# Patient Record
Sex: FEMALE | URBAN - METROPOLITAN AREA
[De-identification: names, ages, dates, MRNs, and addresses within clinical notes are randomized per-mention and may not be internally consistent; named-entity substitution may affect disease eponyms.]

---

## 2021-03-01 ENCOUNTER — APPOINTMENT (RX ONLY)
Dept: URBAN - METROPOLITAN AREA CLINIC 142 | Facility: CLINIC | Age: 76
Setting detail: DERMATOLOGY
End: 2021-03-01

## 2021-03-01 DIAGNOSIS — L57.0 ACTINIC KERATOSIS: ICD-10-CM

## 2021-03-01 DIAGNOSIS — D22 MELANOCYTIC NEVI: ICD-10-CM

## 2021-03-01 DIAGNOSIS — I87.2 VENOUS INSUFFICIENCY (CHRONIC) (PERIPHERAL): ICD-10-CM | Status: INADEQUATELY CONTROLLED

## 2021-03-01 DIAGNOSIS — L82.1 OTHER SEBORRHEIC KERATOSIS: ICD-10-CM

## 2021-03-01 DIAGNOSIS — L81.4 OTHER MELANIN HYPERPIGMENTATION: ICD-10-CM

## 2021-03-01 DIAGNOSIS — L91.8 OTHER HYPERTROPHIC DISORDERS OF THE SKIN: ICD-10-CM

## 2021-03-01 PROBLEM — D22.61 MELANOCYTIC NEVI OF RIGHT UPPER LIMB, INCLUDING SHOULDER: Status: ACTIVE | Noted: 2021-03-01

## 2021-03-01 PROBLEM — D22.71 MELANOCYTIC NEVI OF RIGHT LOWER LIMB, INCLUDING HIP: Status: ACTIVE | Noted: 2021-03-01

## 2021-03-01 PROBLEM — D22.72 MELANOCYTIC NEVI OF LEFT LOWER LIMB, INCLUDING HIP: Status: ACTIVE | Noted: 2021-03-01

## 2021-03-01 PROBLEM — D22.39 MELANOCYTIC NEVI OF OTHER PARTS OF FACE: Status: ACTIVE | Noted: 2021-03-01

## 2021-03-01 PROBLEM — D22.62 MELANOCYTIC NEVI OF LEFT UPPER LIMB, INCLUDING SHOULDER: Status: ACTIVE | Noted: 2021-03-01

## 2021-03-01 PROBLEM — D22.4 MELANOCYTIC NEVI OF SCALP AND NECK: Status: ACTIVE | Noted: 2021-03-01

## 2021-03-01 PROCEDURE — ? PRESCRIPTION

## 2021-03-01 PROCEDURE — 99204 OFFICE O/P NEW MOD 45 MIN: CPT | Mod: 25

## 2021-03-01 PROCEDURE — ? LIQUID NITROGEN

## 2021-03-01 PROCEDURE — ? SUNSCREEN RECOMMENDATIONS

## 2021-03-01 PROCEDURE — 17000 DESTRUCT PREMALG LESION: CPT

## 2021-03-01 PROCEDURE — ? COUNSELING

## 2021-03-01 RX ORDER — TRIAMCINOLONE ACETONIDE 1 MG/G
CREAM TOPICAL BID
Qty: 1 | Refills: 1 | Status: ERX | COMMUNITY
Start: 2021-03-01

## 2021-03-01 RX ADMIN — TRIAMCINOLONE ACETONIDE: 1 CREAM TOPICAL at 00:00

## 2021-03-01 ASSESSMENT — LOCATION DETAILED DESCRIPTION DERM
LOCATION DETAILED: LEFT SUPERIOR LATERAL BUCCAL CHEEK
LOCATION DETAILED: RIGHT PROXIMAL PRETIBIAL REGION
LOCATION DETAILED: RIGHT CENTRAL LATERAL NECK
LOCATION DETAILED: LEFT DISTAL DORSAL FOREARM
LOCATION DETAILED: RIGHT VENTRAL PROXIMAL FOREARM
LOCATION DETAILED: LEFT LATERAL BUCCAL CHEEK
LOCATION DETAILED: RIGHT INFERIOR ANTERIOR NECK
LOCATION DETAILED: LEFT PROXIMAL PRETIBIAL REGION
LOCATION DETAILED: RIGHT PROXIMAL DORSAL FOREARM
LOCATION DETAILED: RIGHT INFERIOR CENTRAL MALAR CHEEK

## 2021-03-01 ASSESSMENT — LOCATION ZONE DERM
LOCATION ZONE: LEG
LOCATION ZONE: FACE
LOCATION ZONE: ARM
LOCATION ZONE: NECK

## 2021-03-01 ASSESSMENT — LOCATION SIMPLE DESCRIPTION DERM
LOCATION SIMPLE: LEFT CHEEK
LOCATION SIMPLE: RIGHT ANTERIOR NECK
LOCATION SIMPLE: RIGHT CHEEK
LOCATION SIMPLE: LEFT FOREARM
LOCATION SIMPLE: NECK
LOCATION SIMPLE: RIGHT PRETIBIAL REGION
LOCATION SIMPLE: RIGHT FOREARM
LOCATION SIMPLE: LEFT PRETIBIAL REGION

## 2021-03-01 ASSESSMENT — PAIN INTENSITY VAS
HOW INTENSE IS YOUR PAIN 0 BEING NO PAIN, 10 BEING THE MOST SEVERE PAIN POSSIBLE?: NO PAIN
HOW INTENSE IS YOUR PAIN 0 BEING NO PAIN, 10 BEING THE MOST SEVERE PAIN POSSIBLE?: 1/10 PAIN

## 2021-03-01 ASSESSMENT — SEVERITY ASSESSMENT: SEVERITY: MILD

## 2021-03-01 NOTE — PROCEDURE: LIQUID NITROGEN
Render Note In Bullet Format When Appropriate: No
Post-Care Instructions: I reviewed with the patient in detail post-care instructions. Patient is to wear sunprotection, and avoid picking at any of the treated lesions. Pt may apply Vaseline to crusted or scabbing areas.
Consent: The patient's consent was obtained including but not limited to risks of crusting, scabbing, blistering, scarring, darker or lighter pigmentary change, recurrence, incomplete removal and infection.
Duration Of Freeze Thaw-Cycle (Seconds): 0
Detail Level: Detailed
Medical Necessity Information: It is in your best interest to select a reason for this procedure from the list below. All of these items fulfill various CMS LCD requirements except the new and changing color options.
Medical Necessity Clause: This procedure was medically necessary because the lesions that were treated were:

## 2021-07-30 ENCOUNTER — HOSPITAL ENCOUNTER (INPATIENT)
Dept: HOSPITAL 96 - M.ERS | Age: 76
LOS: 47 days | Discharge: HOSPICE | DRG: 870 | End: 2021-09-15
Attending: INTERNAL MEDICINE | Admitting: INTERNAL MEDICINE
Payer: COMMERCIAL

## 2021-07-30 VITALS — HEIGHT: 65.98 IN | WEIGHT: 134 LBS | BODY MASS INDEX: 21.53 KG/M2

## 2021-07-30 DIAGNOSIS — I10: ICD-10-CM

## 2021-07-30 DIAGNOSIS — A41.89: Primary | ICD-10-CM

## 2021-07-30 DIAGNOSIS — Z88.1: ICD-10-CM

## 2021-07-30 DIAGNOSIS — R91.1: ICD-10-CM

## 2021-07-30 DIAGNOSIS — E46: ICD-10-CM

## 2021-07-30 DIAGNOSIS — F32.9: ICD-10-CM

## 2021-07-30 DIAGNOSIS — Z91.19: ICD-10-CM

## 2021-07-30 DIAGNOSIS — J80: ICD-10-CM

## 2021-07-30 DIAGNOSIS — J93.9: ICD-10-CM

## 2021-07-30 DIAGNOSIS — Z51.5: ICD-10-CM

## 2021-07-30 DIAGNOSIS — E87.70: ICD-10-CM

## 2021-07-30 DIAGNOSIS — Z66: ICD-10-CM

## 2021-07-30 DIAGNOSIS — T80.211A: ICD-10-CM
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
## 2021-07-30 DIAGNOSIS — F41.9: ICD-10-CM

## 2021-07-30 DIAGNOSIS — J15.1: ICD-10-CM

## 2021-07-30 DIAGNOSIS — T70.29XA: ICD-10-CM

## 2021-07-30 DIAGNOSIS — B37.9: ICD-10-CM

## 2021-07-30 DIAGNOSIS — E87.6: ICD-10-CM

## 2021-07-30 DIAGNOSIS — A52.19: ICD-10-CM

## 2021-07-30 DIAGNOSIS — U07.1: ICD-10-CM

## 2021-07-30 DIAGNOSIS — D69.6: ICD-10-CM

## 2021-07-30 DIAGNOSIS — J12.82: ICD-10-CM

## 2021-07-30 LAB
ABSOLUTE BASOPHILS: 0 THOU/UL (ref 0–0.2)
ABSOLUTE EOSINOPHILS: 0 THOU/UL (ref 0–0.7)
ABSOLUTE MONOCYTES: 0.4 THOU/UL (ref 0–1.2)
ALBUMIN SERPL-MCNC: 3.4 G/DL (ref 3.4–5)
ALP SERPL-CCNC: 65 U/L (ref 46–116)
ALT SERPL-CCNC: 36 U/L (ref 30–65)
ANION GAP SERPL CALC-SCNC: 12 MMOL/L (ref 7–16)
AST SERPL-CCNC: 41 U/L (ref 15–37)
BASOPHILS NFR BLD AUTO: 0.4 %
BE: -0.8 MMOL/L
BILIRUB SERPL-MCNC: 0.5 MG/DL
BILIRUB UR-MCNC: NEGATIVE MG/DL
BUN SERPL-MCNC: 12 MG/DL (ref 7–18)
CALCIUM SERPL-MCNC: 7.7 MG/DL (ref 8.5–10.1)
CHLORIDE SERPL-SCNC: 102 MMOL/L (ref 98–107)
CO2 SERPL-SCNC: 26 MMOL/L (ref 21–32)
COLOR UR: YELLOW
CREAT SERPL-MCNC: 0.7 MG/DL (ref 0.6–1.3)
EOSINOPHIL NFR BLD: 0 %
GLUCOSE SERPL-MCNC: 115 MG/DL (ref 70–99)
GRANULOCYTES NFR BLD MANUAL: 76.6 %
HCT VFR BLD CALC: 39.2 % (ref 37–47)
HGB BLD-MCNC: 14.2 GM/DL (ref 12–15)
KETONES UR STRIP-MCNC: (no result) MG/DL
LYMPHOCYTES # BLD: 0.6 THOU/UL (ref 0.8–5.3)
LYMPHOCYTES NFR BLD AUTO: 13.8 %
MAGNESIUM SERPL-MCNC: 1.9 MG/DL (ref 1.8–2.4)
MCH RBC QN AUTO: 33.8 PG (ref 26–34)
MCHC RBC AUTO-ENTMCNC: 36.2 G/DL (ref 28–37)
MCV RBC: 93.3 FL (ref 80–100)
MONOCYTES NFR BLD: 9.2 %
MPV: 7.9 FL. (ref 7.2–11.1)
NEUTROPHILS # BLD: 3.2 THOU/UL (ref 1.6–8.1)
NUCLEATED RBCS: 0 /100WBC
PCO2 BLD: 30.5 MMHG (ref 35–45)
PLATELET COUNT*: 133 THOU/UL (ref 150–400)
PO2 BLD: 57.9 MMHG (ref 75–100)
POTASSIUM SERPL-SCNC: 2.5 MMOL/L (ref 3.5–5.1)
PROT SERPL-MCNC: 6.4 G/DL (ref 6.4–8.2)
PROT UR QL STRIP: NEGATIVE
RBC # BLD AUTO: 4.2 MIL/UL (ref 4.2–5)
RBC # UR STRIP: (no result) /UL
RDW-CV: 13.1 % (ref 10.5–14.5)
SODIUM SERPL-SCNC: 140 MMOL/L (ref 136–145)
SP GR UR STRIP: 1.01 (ref 1–1.03)
URINE CLARITY: CLEAR
URINE GLUCOSE-RANDOM: NEGATIVE
URINE LEUKOCYTES-REFLEX: NEGATIVE
URINE NITRITE-REFLEX: NEGATIVE
UROBILINOGEN UR STRIP-ACNC: 0.2 E.U./DL (ref 0.2–1)
WBC # BLD AUTO: 4.1 THOU/UL (ref 4–11)

## 2021-07-31 VITALS — DIASTOLIC BLOOD PRESSURE: 67 MMHG | SYSTOLIC BLOOD PRESSURE: 147 MMHG

## 2021-07-31 VITALS — SYSTOLIC BLOOD PRESSURE: 164 MMHG | DIASTOLIC BLOOD PRESSURE: 64 MMHG

## 2021-07-31 VITALS — SYSTOLIC BLOOD PRESSURE: 167 MMHG | DIASTOLIC BLOOD PRESSURE: 51 MMHG

## 2021-07-31 VITALS — DIASTOLIC BLOOD PRESSURE: 78 MMHG | SYSTOLIC BLOOD PRESSURE: 140 MMHG

## 2021-07-31 VITALS — DIASTOLIC BLOOD PRESSURE: 66 MMHG | SYSTOLIC BLOOD PRESSURE: 130 MMHG

## 2021-07-31 VITALS — SYSTOLIC BLOOD PRESSURE: 150 MMHG | DIASTOLIC BLOOD PRESSURE: 72 MMHG

## 2021-07-31 NOTE — EKG
[place] [address]
Phone:  [phone]                     ELECTROCARDIOGRAM REPORT      
_______________________________________________________________________________
 
Name:         ORALIA GUDINO               Room:          [address] [place] 
.R.#:    [id number]     Account #:     [id number]  
Admission:    21    Attend Phys:   James Leigh
Discharge:                Date of Birth: [birth year]  
Date of Service: 21  Report #:      [id number]
        [id number]
_______________________________________________________________________________
THIS REPORT FOR:  //name//                      
 
                         [facility] ED
                                       
Test Date:    2021               Test Time:    20:51:36
Pat Name:     ORALIAAUGUSTUS GUDINO            Department:   
Patient ID:   [id number]            Room:         [facility]
Gender:       F                        Technician:   
:          1945               Requested By: Charla Aguilar
Order Number: [id number] MD:   Scooter Zepeda
                                 Measurements
Intervals                              Axis          
Rate:         85                       P:            38
OR:           149                      QRS:          -35
QRSD:         98                       T:            76
QT:           372                                    
QTc:          443                                    
                           Interpretive Statements
Sinus rhythm
Multiform ventricular premature complexes
Probable left atrial enlargement
Left axis deviation
RSR' in V1 or V2, right VCD or RVH
No previous ECG available for comparison
Electronically Signed On 2021 10:51:44 CDT by Scooter Zepeda
[email or web address]
 
 
 
 
 
 
 
 
 
 
 
 
 
 
 
 
 
 
  <ELECTRONICALLY SIGNED>
                                           By: Scooter Zepeda MD, FACC      
  21
D: 21   _____________________________________
T: 21   Scooter Zepeda MD, FAC        /EPI

## 2021-08-01 VITALS — SYSTOLIC BLOOD PRESSURE: 146 MMHG | DIASTOLIC BLOOD PRESSURE: 67 MMHG

## 2021-08-01 VITALS — SYSTOLIC BLOOD PRESSURE: 144 MMHG | DIASTOLIC BLOOD PRESSURE: 61 MMHG

## 2021-08-01 VITALS — SYSTOLIC BLOOD PRESSURE: 140 MMHG | DIASTOLIC BLOOD PRESSURE: 62 MMHG

## 2021-08-01 VITALS — SYSTOLIC BLOOD PRESSURE: 148 MMHG | DIASTOLIC BLOOD PRESSURE: 76 MMHG

## 2021-08-01 VITALS — DIASTOLIC BLOOD PRESSURE: 61 MMHG | SYSTOLIC BLOOD PRESSURE: 144 MMHG

## 2021-08-01 LAB
ANION GAP SERPL CALC-SCNC: 11 MMOL/L (ref 7–16)
BUN SERPL-MCNC: 8 MG/DL (ref 7–18)
CALCIUM SERPL-MCNC: 7.6 MG/DL (ref 8.5–10.1)
CHLORIDE SERPL-SCNC: 105 MMOL/L (ref 98–107)
CO2 SERPL-SCNC: 25 MMOL/L (ref 21–32)
CREAT SERPL-MCNC: 0.6 MG/DL (ref 0.6–1.3)
GLUCOSE SERPL-MCNC: 101 MG/DL (ref 70–99)
HCT VFR BLD CALC: 38.2 % (ref 37–47)
HGB BLD-MCNC: 13.6 GM/DL (ref 12–15)
MCH RBC QN AUTO: 33.4 PG (ref 26–34)
MCHC RBC AUTO-ENTMCNC: 35.6 G/DL (ref 28–37)
MCV RBC: 93.8 FL (ref 80–100)
MPV: 7.7 FL. (ref 7.2–11.1)
PLATELET COUNT*: 125 THOU/UL (ref 150–400)
POTASSIUM SERPL-SCNC: 2.4 MMOL/L (ref 3.5–5.1)
RBC # BLD AUTO: 4.07 MIL/UL (ref 4.2–5)
RDW-CV: 13.1 % (ref 10.5–14.5)
SODIUM SERPL-SCNC: 141 MMOL/L (ref 136–145)
WBC # BLD AUTO: 3.6 THOU/UL (ref 4–11)

## 2021-08-01 PROCEDURE — 5A0935A ASSISTANCE WITH RESPIRATORY VENTILATION, LESS THAN 24 CONSECUTIVE HOURS, HIGH NASAL FLOW/VELOCITY: ICD-10-PCS | Performed by: INTERNAL MEDICINE

## 2021-08-02 VITALS — SYSTOLIC BLOOD PRESSURE: 110 MMHG | DIASTOLIC BLOOD PRESSURE: 61 MMHG

## 2021-08-02 VITALS — DIASTOLIC BLOOD PRESSURE: 52 MMHG | SYSTOLIC BLOOD PRESSURE: 125 MMHG

## 2021-08-02 VITALS — DIASTOLIC BLOOD PRESSURE: 55 MMHG | SYSTOLIC BLOOD PRESSURE: 116 MMHG

## 2021-08-02 VITALS — DIASTOLIC BLOOD PRESSURE: 59 MMHG | SYSTOLIC BLOOD PRESSURE: 123 MMHG

## 2021-08-02 LAB
ANION GAP SERPL CALC-SCNC: 10 MMOL/L (ref 7–16)
BUN SERPL-MCNC: 9 MG/DL (ref 7–18)
CALCIUM SERPL-MCNC: 7.7 MG/DL (ref 8.5–10.1)
CHLORIDE SERPL-SCNC: 109 MMOL/L (ref 98–107)
CO2 SERPL-SCNC: 24 MMOL/L (ref 21–32)
CREAT SERPL-MCNC: 0.5 MG/DL (ref 0.6–1.3)
GLUCOSE SERPL-MCNC: 121 MG/DL (ref 70–99)
HCT VFR BLD CALC: 36.8 % (ref 37–47)
HGB BLD-MCNC: 13.1 GM/DL (ref 12–15)
MCH RBC QN AUTO: 33.6 PG (ref 26–34)
MCHC RBC AUTO-ENTMCNC: 35.6 G/DL (ref 28–37)
MCV RBC: 94.4 FL (ref 80–100)
MPV: 7.7 FL. (ref 7.2–11.1)
PLATELET COUNT*: 139 THOU/UL (ref 150–400)
POTASSIUM SERPL-SCNC: 3.1 MMOL/L (ref 3.5–5.1)
RBC # BLD AUTO: 3.9 MIL/UL (ref 4.2–5)
RDW-CV: 13.1 % (ref 10.5–14.5)
SODIUM SERPL-SCNC: 143 MMOL/L (ref 136–145)
WBC # BLD AUTO: 4.6 THOU/UL (ref 4–11)

## 2021-08-02 PROCEDURE — XW033E5 INTRODUCTION OF REMDESIVIR ANTI-INFECTIVE INTO PERIPHERAL VEIN, PERCUTANEOUS APPROACH, NEW TECHNOLOGY GROUP 5: ICD-10-PCS

## 2021-08-02 PROCEDURE — 5A09357 ASSISTANCE WITH RESPIRATORY VENTILATION, LESS THAN 24 CONSECUTIVE HOURS, CONTINUOUS POSITIVE AIRWAY PRESSURE: ICD-10-PCS | Performed by: INTERNAL MEDICINE

## 2021-08-03 VITALS — DIASTOLIC BLOOD PRESSURE: 65 MMHG | SYSTOLIC BLOOD PRESSURE: 129 MMHG

## 2021-08-03 VITALS — DIASTOLIC BLOOD PRESSURE: 68 MMHG | SYSTOLIC BLOOD PRESSURE: 132 MMHG

## 2021-08-03 VITALS — SYSTOLIC BLOOD PRESSURE: 139 MMHG | DIASTOLIC BLOOD PRESSURE: 76 MMHG

## 2021-08-03 VITALS — SYSTOLIC BLOOD PRESSURE: 129 MMHG | DIASTOLIC BLOOD PRESSURE: 68 MMHG

## 2021-08-03 VITALS — DIASTOLIC BLOOD PRESSURE: 69 MMHG | SYSTOLIC BLOOD PRESSURE: 133 MMHG

## 2021-08-03 VITALS — SYSTOLIC BLOOD PRESSURE: 134 MMHG | DIASTOLIC BLOOD PRESSURE: 66 MMHG

## 2021-08-03 VITALS — SYSTOLIC BLOOD PRESSURE: 124 MMHG | DIASTOLIC BLOOD PRESSURE: 70 MMHG

## 2021-08-03 VITALS — SYSTOLIC BLOOD PRESSURE: 124 MMHG | DIASTOLIC BLOOD PRESSURE: 63 MMHG

## 2021-08-03 LAB
ABSOLUTE MONOCYTES: 0.1 THOU/UL (ref 0–1.2)
ALBUMIN SERPL-MCNC: 2.8 G/DL (ref 3.4–5)
ALP SERPL-CCNC: 56 U/L (ref 46–116)
ALT SERPL-CCNC: 44 U/L (ref 30–65)
ANION GAP SERPL CALC-SCNC: 9 MMOL/L (ref 7–16)
ANION GAP SERPL CALC-SCNC: 9 MMOL/L (ref 7–16)
ANISOCYTOSIS BLD QL SMEAR: (no result)
APTT BLD: 32.3 SECONDS (ref 25–31.3)
AST SERPL-CCNC: 63 U/L (ref 15–37)
BILIRUB SERPL-MCNC: 0.3 MG/DL
BUN SERPL-MCNC: 11 MG/DL (ref 7–18)
BUN SERPL-MCNC: 14 MG/DL (ref 7–18)
CALCIUM SERPL-MCNC: 8.3 MG/DL (ref 8.5–10.1)
CALCIUM SERPL-MCNC: 8.5 MG/DL (ref 8.5–10.1)
CHLORIDE SERPL-SCNC: 106 MMOL/L (ref 98–107)
CHLORIDE SERPL-SCNC: 107 MMOL/L (ref 98–107)
CO2 SERPL-SCNC: 26 MMOL/L (ref 21–32)
CO2 SERPL-SCNC: 27 MMOL/L (ref 21–32)
CREAT SERPL-MCNC: 0.6 MG/DL (ref 0.6–1.3)
CREAT SERPL-MCNC: 0.6 MG/DL (ref 0.6–1.3)
GLUCOSE SERPL-MCNC: 155 MG/DL (ref 70–99)
GLUCOSE SERPL-MCNC: 165 MG/DL (ref 70–99)
GRANULOCYTES NFR BLD MANUAL: 89 %
HCT VFR BLD CALC: 38.6 % (ref 37–47)
HGB BLD-MCNC: 13.7 GM/DL (ref 12–15)
INR PPP: 0.9
LYMPHOCYTES # BLD: 0.6 THOU/UL (ref 0.8–5.3)
LYMPHOCYTES NFR BLD AUTO: 9 %
MAGNESIUM SERPL-MCNC: 2 MG/DL (ref 1.8–2.4)
MAGNESIUM SERPL-MCNC: 2 MG/DL (ref 1.8–2.4)
MCH RBC QN AUTO: 33.3 PG (ref 26–34)
MCHC RBC AUTO-ENTMCNC: 35.4 G/DL (ref 28–37)
MCV RBC: 94.2 FL (ref 80–100)
MONOCYTES NFR BLD: 2 %
MPV: 7.8 FL. (ref 7.2–11.1)
NEUTROPHILS # BLD: 6.1 THOU/UL (ref 1.6–8.1)
NUCLEATED RBCS: 0 /100WBC
PHOSPHATE SERPL-MCNC: 3 MG/DL (ref 2.5–4.9)
PLATELET # BLD EST: ADEQUATE 10*3/UL
PLATELET COUNT*: 181 THOU/UL (ref 150–400)
POIKILOCYTOSIS BLD QL SMEAR: (no result)
POTASSIUM SERPL-SCNC: 4 MMOL/L (ref 3.5–5.1)
POTASSIUM SERPL-SCNC: 4.1 MMOL/L (ref 3.5–5.1)
PROT SERPL-MCNC: 6.2 G/DL (ref 6.4–8.2)
PROTHROMBIN TIME: 10.1 SECONDS (ref 9.2–11.5)
RBC # BLD AUTO: 4.1 MIL/UL (ref 4.2–5)
RDW-CV: 13.3 % (ref 10.5–14.5)
SODIUM SERPL-SCNC: 142 MMOL/L (ref 136–145)
SODIUM SERPL-SCNC: 142 MMOL/L (ref 136–145)
WBC # BLD AUTO: 6.9 THOU/UL (ref 4–11)

## 2021-08-03 PROCEDURE — 5A0935A ASSISTANCE WITH RESPIRATORY VENTILATION, LESS THAN 24 CONSECUTIVE HOURS, HIGH NASAL FLOW/VELOCITY: ICD-10-PCS

## 2021-08-03 PROCEDURE — 5A09357 ASSISTANCE WITH RESPIRATORY VENTILATION, LESS THAN 24 CONSECUTIVE HOURS, CONTINUOUS POSITIVE AIRWAY PRESSURE: ICD-10-PCS

## 2021-08-03 PROCEDURE — XW13325 TRANSFUSION OF CONVALESCENT PLASMA (NONAUTOLOGOUS) INTO PERIPHERAL VEIN, PERCUTANEOUS APPROACH, NEW TECHNOLOGY GROUP 5: ICD-10-PCS | Performed by: INTERNAL MEDICINE

## 2021-08-03 NOTE — 2DMMODE
[place] [address]
Phone:  [phone] 2 D/M-MODE ECHOCARDIOGRAM     
_______________________________________________________________________________
 
Name:         ORALIA GUDINO               Room:          [address] [place] 
[facility]#:    [id number]     Account #:     [id number]  
Admission:    21    Attend Phys:   James Leigh
Discharge:                Date of Birth: [birth year]  
Date of Service: 21 1522  Report #:      [id number]
        [id number]
_______________________________________________________________________________
THIS REPORT FOR:
 
cc:  Alex Brady Michael G DO Biggs, [facility]      
                                                                       ~
 
--------------- APPROVED REPORT --------------
 
 
Study performed:  2021 09:45:46
 
EXAM: Comprehensive 2D, Doppler, and color-flow 
Echocardiogram 
Patient Location: In-Patient   
Room #:  [facility]     Status:  routine
 
      BSA:         1.86
HR: 91 bpm BP:          129/68 mmHg 
Rhythm: NSR     
 
Other Information 
Study Quality: Good
 
Indications
hypoxia
 
2D Dimensions
IVSd:  9.46 (7-11mm) LVOT Diam:  21.37 (18-24mm) 
LVDd:  56.15 mm  
PWd:  9.81 (7-11mm) Ascending Ao:  32.06 (22-36mm)
LVDs:  30.05 (25-40mm) 
Aortic Root:  31.84 mm 
 
Volumes
Left Atrial Volume (Systole) 
    LA ESV Index:  27.20 mL/m2
 
Aortic Valve
AoV Peak Luis.:  1.63 m/s 
AO Peak Gr.:  10.57 mmHg LVOT Max P.52 mmHg
AO Mean Gr.:  5.93 mmHg  LVOT Mean P.14 mmHg
    LVOT Max V:  1.06 m/s
AO V2 VTI:  29.41 cm  LVOT Mean V:  0.67 m/s
BIMAL (VTI):  2.31 cm2  LVOT V1 VTI:  18.93 cm
 
 
 
[place] [address]
Phone:  [phone]                     2 D/M-MODE ECHOCARDIOGRAM     
_______________________________________________________________________________
 
Name:         ORALIA GUDINO               Room:          [address] [place] 
.R.#:    [id number]     Account #:     [id number]  
Admission:    21    Attend Phys:   James Leigh
Discharge:                Date of Birth: [birth year]  
Date of Service: 21 1522  Report #:      [id number]
        [id number]
_______________________________________________________________________________
Mitral Valve
    E/A Ratio:  0.79
    MV Decel. Time:  198.91 ms
MV E Max Luis.:  0.76 m/s 
MV PHT:  57.68 ms  
MVA (PHT):  3.81 cm2  
 
TDI
E/Lateral E':  7.60 E/Medial E':  7.60
   Medial E' Luis.:  0.10 m/s
   Lateral E' Luis.:  0.10 m/s
 
Pulmonary Valve
PV Peak Luis.:  1.15 m/s PV Peak Gr.:  5.32 mmHg
 
Tricuspid Valve
    RAP Estimate:  5.00 mmHg
TR Peak Gr.:  28.31 mmHg RVSP:  33.00 mmHg
    PA Pressure:  33.00 mmHg
 
Left Ventricle
The left ventricle is normal size. There is normal LV segmental wall 
motion. There is normal left ventricular wall thickness. Left 
ventricular systolic function is normal. The left ventricular 
ejection fraction is within the normal range. LVEF is 65-70%. Grade I 
- abnormal relaxation pattern.
 
Right Ventricle
The right ventricle is normal size. The right ventricular systolic 
function is normal.
 
Atria
The left atrium size is normal. The right atrium size is 
normal.
 
Aortic Valve
The aortic valve is normal in structure. No aortic regurgitation is 
present. There is no aortic valvular stenosis.
 
Mitral Valve
The mitral valve is normal in structure. Trace mitral regurgitation. 
No evidence of mitral valve stenosis.
 
Tricuspid Valve
The tricuspid valve is normal in structure. Trace tricuspid 
regurgitation. Mild pulmonary hypertension.
 
 
[place] [address]
Phone:  [phone]                     2 D/M-MODE ECHOCARDIOGRAM     
_______________________________________________________________________________
 
Name:         ORALIA GUDINO               Room:          [address]#:    [id number]     Account #:     [id number]  
Admission:    21    Attend Phys:   James Leigh
Discharge:                Date of Birth: [birth year]  
Date of Service: 21 1522  Report #:      [id number]
        [id number]
_______________________________________________________________________________
 
Pulmonic Valve
The pulmonary valve is normal in structure. There is no pulmonic 
valvular regurgitation.
 
Great Vessels
The aortic root is normal in size. IVC is normal in size and 
collapses >50% with inspiration.
 
Pericardium
There is no pericardial effusion.
 
<Conclusion>
LVEF is 65-70%.
Grade I - abnormal relaxation pattern.
The left ventricle is normal size.
There is normal left ventricular wall thickness.
There is normal LV segmental wall motion.
The aortic valve is normal in structure.
Trace mitral regurgitation.
Trace tricuspid regurgitation.
Mild pulmonary hypertension.
 
 
 
 
 
 
 
 
 
 
 
 
 
 
 
 
 
 
 
 
 
 
  <ELECTRONICALLY SIGNED>
                                           By: MAYCO Van MD, FACC    
  21     1522
D: 21 152   _____________________________________
T: 21 152   MAYCO Van MD, FACC      /INF

## 2021-08-04 VITALS — DIASTOLIC BLOOD PRESSURE: 61 MMHG | SYSTOLIC BLOOD PRESSURE: 124 MMHG

## 2021-08-04 VITALS — DIASTOLIC BLOOD PRESSURE: 55 MMHG | SYSTOLIC BLOOD PRESSURE: 111 MMHG

## 2021-08-04 VITALS — SYSTOLIC BLOOD PRESSURE: 130 MMHG | DIASTOLIC BLOOD PRESSURE: 58 MMHG

## 2021-08-04 VITALS — SYSTOLIC BLOOD PRESSURE: 123 MMHG | DIASTOLIC BLOOD PRESSURE: 67 MMHG

## 2021-08-04 VITALS — DIASTOLIC BLOOD PRESSURE: 65 MMHG | SYSTOLIC BLOOD PRESSURE: 138 MMHG

## 2021-08-04 VITALS — SYSTOLIC BLOOD PRESSURE: 109 MMHG | DIASTOLIC BLOOD PRESSURE: 52 MMHG

## 2021-08-04 VITALS — DIASTOLIC BLOOD PRESSURE: 66 MMHG | SYSTOLIC BLOOD PRESSURE: 146 MMHG

## 2021-08-04 VITALS — SYSTOLIC BLOOD PRESSURE: 138 MMHG | DIASTOLIC BLOOD PRESSURE: 65 MMHG

## 2021-08-04 LAB
ABSOLUTE BASOPHILS: 0 THOU/UL (ref 0–0.2)
ABSOLUTE EOSINOPHILS: 0 THOU/UL (ref 0–0.7)
ABSOLUTE MONOCYTES: 0.4 THOU/UL (ref 0–1.2)
ALBUMIN SERPL-MCNC: 2.8 G/DL (ref 3.4–5)
ALP SERPL-CCNC: 58 U/L (ref 46–116)
ALT SERPL-CCNC: 65 U/L (ref 30–65)
ANION GAP SERPL CALC-SCNC: 7 MMOL/L (ref 7–16)
AST SERPL-CCNC: 78 U/L (ref 15–37)
BASOPHILS NFR BLD AUTO: 0 %
BILIRUB SERPL-MCNC: 0.4 MG/DL
BUN SERPL-MCNC: 21 MG/DL (ref 7–18)
CALCIUM SERPL-MCNC: 8.3 MG/DL (ref 8.5–10.1)
CHLORIDE SERPL-SCNC: 108 MMOL/L (ref 98–107)
CO2 SERPL-SCNC: 30 MMOL/L (ref 21–32)
CREAT SERPL-MCNC: 0.7 MG/DL (ref 0.6–1.3)
EOSINOPHIL NFR BLD: 0 %
GLUCOSE SERPL-MCNC: 155 MG/DL (ref 70–99)
GRANULOCYTES NFR BLD MANUAL: 88.2 %
HCT VFR BLD CALC: 38.5 % (ref 37–47)
HGB BLD-MCNC: 13.4 GM/DL (ref 12–15)
LYMPHOCYTES # BLD: 0.5 THOU/UL (ref 0.8–5.3)
LYMPHOCYTES NFR BLD AUTO: 6 %
MAGNESIUM SERPL-MCNC: 2 MG/DL (ref 1.8–2.4)
MCH RBC QN AUTO: 33.2 PG (ref 26–34)
MCHC RBC AUTO-ENTMCNC: 34.9 G/DL (ref 28–37)
MCV RBC: 95.1 FL (ref 80–100)
MONOCYTES NFR BLD: 5.8 %
MPV: 7.2 FL. (ref 7.2–11.1)
NEUTROPHILS # BLD: 6.8 THOU/UL (ref 1.6–8.1)
NUCLEATED RBCS: 0 /100WBC
PLATELET COUNT*: 213 THOU/UL (ref 150–400)
POTASSIUM SERPL-SCNC: 4 MMOL/L (ref 3.5–5.1)
PROT SERPL-MCNC: 6.3 G/DL (ref 6.4–8.2)
RBC # BLD AUTO: 4.05 MIL/UL (ref 4.2–5)
RDW-CV: 13.2 % (ref 10.5–14.5)
SODIUM SERPL-SCNC: 145 MMOL/L (ref 136–145)
WBC # BLD AUTO: 7.7 THOU/UL (ref 4–11)

## 2021-08-04 PROCEDURE — 5A0935A ASSISTANCE WITH RESPIRATORY VENTILATION, LESS THAN 24 CONSECUTIVE HOURS, HIGH NASAL FLOW/VELOCITY: ICD-10-PCS | Performed by: INTERNAL MEDICINE

## 2021-08-04 PROCEDURE — 5A09357 ASSISTANCE WITH RESPIRATORY VENTILATION, LESS THAN 24 CONSECUTIVE HOURS, CONTINUOUS POSITIVE AIRWAY PRESSURE: ICD-10-PCS

## 2021-08-05 VITALS — DIASTOLIC BLOOD PRESSURE: 57 MMHG | SYSTOLIC BLOOD PRESSURE: 119 MMHG

## 2021-08-05 VITALS — DIASTOLIC BLOOD PRESSURE: 68 MMHG | SYSTOLIC BLOOD PRESSURE: 134 MMHG

## 2021-08-05 VITALS — DIASTOLIC BLOOD PRESSURE: 66 MMHG | SYSTOLIC BLOOD PRESSURE: 122 MMHG

## 2021-08-05 VITALS — SYSTOLIC BLOOD PRESSURE: 132 MMHG | DIASTOLIC BLOOD PRESSURE: 61 MMHG

## 2021-08-05 VITALS — DIASTOLIC BLOOD PRESSURE: 57 MMHG | SYSTOLIC BLOOD PRESSURE: 103 MMHG

## 2021-08-05 LAB
ABSOLUTE BASOPHILS: 0 THOU/UL (ref 0–0.2)
ABSOLUTE EOSINOPHILS: 0 THOU/UL (ref 0–0.7)
ABSOLUTE MONOCYTES: 0.4 THOU/UL (ref 0–1.2)
ALBUMIN SERPL-MCNC: 2.8 G/DL (ref 3.4–5)
ALP SERPL-CCNC: 61 U/L (ref 46–116)
ALT SERPL-CCNC: 61 U/L (ref 30–65)
ANION GAP SERPL CALC-SCNC: 7 MMOL/L (ref 7–16)
AST SERPL-CCNC: 61 U/L (ref 15–37)
BASOPHILS NFR BLD AUTO: 0.2 %
BILIRUB SERPL-MCNC: 0.5 MG/DL
BUN SERPL-MCNC: 22 MG/DL (ref 7–18)
CALCIUM SERPL-MCNC: 8.3 MG/DL (ref 8.5–10.1)
CHLORIDE SERPL-SCNC: 109 MMOL/L (ref 98–107)
CO2 SERPL-SCNC: 30 MMOL/L (ref 21–32)
CREAT SERPL-MCNC: 0.7 MG/DL (ref 0.6–1.3)
EOSINOPHIL NFR BLD: 0 %
GLUCOSE SERPL-MCNC: 151 MG/DL (ref 70–99)
GRANULOCYTES NFR BLD MANUAL: 91.1 %
HCT VFR BLD CALC: 39.9 % (ref 37–47)
HGB BLD-MCNC: 14 GM/DL (ref 12–15)
LYMPHOCYTES # BLD: 0.5 THOU/UL (ref 0.8–5.3)
LYMPHOCYTES NFR BLD AUTO: 4.8 %
MAGNESIUM SERPL-MCNC: 2 MG/DL (ref 1.8–2.4)
MCH RBC QN AUTO: 33.2 PG (ref 26–34)
MCHC RBC AUTO-ENTMCNC: 35 G/DL (ref 28–37)
MCV RBC: 94.9 FL (ref 80–100)
MONOCYTES NFR BLD: 3.9 %
MPV: 7.1 FL. (ref 7.2–11.1)
NEUTROPHILS # BLD: 9.5 THOU/UL (ref 1.6–8.1)
NUCLEATED RBCS: 0 /100WBC
PLATELET COUNT*: 266 THOU/UL (ref 150–400)
POTASSIUM SERPL-SCNC: 4.1 MMOL/L (ref 3.5–5.1)
PROT SERPL-MCNC: 6.2 G/DL (ref 6.4–8.2)
RBC # BLD AUTO: 4.2 MIL/UL (ref 4.2–5)
RDW-CV: 13.2 % (ref 10.5–14.5)
SODIUM SERPL-SCNC: 146 MMOL/L (ref 136–145)
WBC # BLD AUTO: 10.5 THOU/UL (ref 4–11)

## 2021-08-05 PROCEDURE — 5A0935A ASSISTANCE WITH RESPIRATORY VENTILATION, LESS THAN 24 CONSECUTIVE HOURS, HIGH NASAL FLOW/VELOCITY: ICD-10-PCS | Performed by: INTERNAL MEDICINE

## 2021-08-05 PROCEDURE — 5A09357 ASSISTANCE WITH RESPIRATORY VENTILATION, LESS THAN 24 CONSECUTIVE HOURS, CONTINUOUS POSITIVE AIRWAY PRESSURE: ICD-10-PCS

## 2021-08-06 VITALS — SYSTOLIC BLOOD PRESSURE: 125 MMHG | DIASTOLIC BLOOD PRESSURE: 70 MMHG

## 2021-08-06 VITALS — DIASTOLIC BLOOD PRESSURE: 63 MMHG | SYSTOLIC BLOOD PRESSURE: 135 MMHG

## 2021-08-06 VITALS — SYSTOLIC BLOOD PRESSURE: 129 MMHG | DIASTOLIC BLOOD PRESSURE: 76 MMHG

## 2021-08-06 VITALS — DIASTOLIC BLOOD PRESSURE: 70 MMHG | SYSTOLIC BLOOD PRESSURE: 139 MMHG

## 2021-08-06 VITALS — SYSTOLIC BLOOD PRESSURE: 176 MMHG | DIASTOLIC BLOOD PRESSURE: 61 MMHG

## 2021-08-06 LAB
ABSOLUTE BASOPHILS: 0 THOU/UL (ref 0–0.2)
ABSOLUTE EOSINOPHILS: 0 THOU/UL (ref 0–0.7)
ABSOLUTE MONOCYTES: 0.4 THOU/UL (ref 0–1.2)
ALBUMIN SERPL-MCNC: 2.8 G/DL (ref 3.4–5)
ALP SERPL-CCNC: 64 U/L (ref 46–116)
ALT SERPL-CCNC: 50 U/L (ref 30–65)
ANION GAP SERPL CALC-SCNC: 9 MMOL/L (ref 7–16)
AST SERPL-CCNC: 43 U/L (ref 15–37)
BASOPHILS NFR BLD AUTO: 0.1 %
BILIRUB SERPL-MCNC: 0.5 MG/DL
BUN SERPL-MCNC: 24 MG/DL (ref 7–18)
CALCIUM SERPL-MCNC: 8.2 MG/DL (ref 8.5–10.1)
CHLORIDE SERPL-SCNC: 110 MMOL/L (ref 98–107)
CO2 SERPL-SCNC: 27 MMOL/L (ref 21–32)
CREAT SERPL-MCNC: 0.7 MG/DL (ref 0.6–1.3)
EOSINOPHIL NFR BLD: 0 %
GLUCOSE SERPL-MCNC: 156 MG/DL (ref 70–99)
GRANULOCYTES NFR BLD MANUAL: 89.3 %
HCT VFR BLD CALC: 39.2 % (ref 37–47)
HGB BLD-MCNC: 13.7 GM/DL (ref 12–15)
LYMPHOCYTES # BLD: 0.6 THOU/UL (ref 0.8–5.3)
LYMPHOCYTES NFR BLD AUTO: 6.2 %
MCH RBC QN AUTO: 33.3 PG (ref 26–34)
MCHC RBC AUTO-ENTMCNC: 34.9 G/DL (ref 28–37)
MCV RBC: 95.6 FL (ref 80–100)
MONOCYTES NFR BLD: 4.4 %
MPV: 7.1 FL. (ref 7.2–11.1)
NEUTROPHILS # BLD: 8.7 THOU/UL (ref 1.6–8.1)
NUCLEATED RBCS: 0 /100WBC
PLATELET COUNT*: 273 THOU/UL (ref 150–400)
POTASSIUM SERPL-SCNC: 4 MMOL/L (ref 3.5–5.1)
PREALB SERPL-MCNC: 18.4 MG/DL (ref 18–35.7)
PROT SERPL-MCNC: 5.9 G/DL (ref 6.4–8.2)
RBC # BLD AUTO: 4.1 MIL/UL (ref 4.2–5)
RDW-CV: 12.7 % (ref 10.5–14.5)
SODIUM SERPL-SCNC: 146 MMOL/L (ref 136–145)
WBC # BLD AUTO: 9.8 THOU/UL (ref 4–11)

## 2021-08-06 PROCEDURE — 5A09357 ASSISTANCE WITH RESPIRATORY VENTILATION, LESS THAN 24 CONSECUTIVE HOURS, CONTINUOUS POSITIVE AIRWAY PRESSURE: ICD-10-PCS

## 2021-08-06 PROCEDURE — 5A0935A ASSISTANCE WITH RESPIRATORY VENTILATION, LESS THAN 24 CONSECUTIVE HOURS, HIGH NASAL FLOW/VELOCITY: ICD-10-PCS | Performed by: INTERNAL MEDICINE

## 2021-08-06 NOTE — CON
[address]  [address]                    CONSULTATION                  
_______________________________________________________________________________
 
Name:       SHAHABORALIA E                Room:           [address] [place]  
M.R.#:  [id number]      Account #:      [id number]  
Admission:  07/30/21     Attend Phys:    JUANCARLOS Pizano
Discharge:               Date of Birth:  [birth year]  
         Report #: [id number]
                                                                     [id number]
_______________________________________________________________________________
THIS REPORT FOR:  
 
cc:  Alex Brady Michael G DO Pervez, Adeel MD                                                   ~
 
 
DATE OF CONSULTATION: 08/03/2021
 
REQUESTING PHYSICIAN:  Astrid Borja DO
 
INDICATION FOR CONSULTATION:  Acute hypoxemic respiratory failure secondary to 
COVID-19.
 
HISTORY OF PRESENT ILLNESS:  This is a 75-year-old female, past medical history 
includes a history of hypertension.  She is a lifetime nonsmoker.  The patient 
has had contact with the family members with COVID-19, is now admitted herself 
with COVID-19, has progressively become more and more hypoxemic last night.  I 
was consulted.  Planned O2 saturation could no longer be maintained on a heated 
high-flow nasal cannula and I proceeded to placing the patient on a BiPAP.  The 
patient has been oxygenating with 100% FIO2 on the BiPAP in the mid 90s.  There 
was an attempt made to switch her over to heated high-flow nasal cannula.  The 
patient desaturated to the 70s.  She did receive Norvasc yesterday, which I 
discontinued. Blood pressure is towards the lower end of normal range at 124/63.
 She had significant hypokalemia, which has been replaced.  She has limited 
peripheral access, although we do have enough access for current medications.  
The patient complains of shortness of breath.  She answers to the negative for 
12 other questions for review of systems with the exception that she does have a
cough.  There is not much sputum.  There is no chest pain.  She says that 
earlier she did have fever, headache and nausea.  There is no swelling of lower 
extremities or calf pain.
 
PAST MEDICAL HISTORY:  Hypertension.
 
FAMILY HISTORY:  Son has COVID-19.
 
SOCIAL HISTORY:  No known history of smoking, ethanol abuse, or drug abuse.
 
CURRENT MEDICATIONS:  List in [employer] reviewed.
 
HOME MEDICATIONS:  List in [employer] reviewed.
 
ALLERGIES:  NIACIN.
 
PHYSICAL EXAMINATION:
GENERAL:  She is alert, awake and oriented, does not appear to be in any 
 
 
 
[place] [address]                    CONSULTATION                  
_______________________________________________________________________________
 
Name:       ORALIA GUDINO                Room:           [address]#:  [id number]      Account #:      [id number]  
Admission:  07/30/21     Attend Phys:    JUANCARLOS Pizano
Discharge:               Date of Birth:  [birth year]  
         Report #: [id number]
                                                                     [id number]
_______________________________________________________________________________
 
 
distress at this time and is in AVAPS mode BiPAP 100% FIO2.
VITAL SIGNS:  O2 saturation coming up to the low 90s, was in the 70s on heated 
high-flow nasal cannula.  Vitals in [employer] reviewed.
HEENT:  Head normocephalic and atraumatic.  Pupils equal, reactive.  No throat 
erythema.
NECK:  Does not show raised JVP asymmetry, mass or lymph nodes.
CHEST:  Symmetrical expansion on inspection and palpation.  On auscultation, 
breath sounds are equal, decreased.  No added sounds.
HEART:  Regular, no murmur.
ABDOMEN:  Soft and nontender.
EXTREMITIES:  Lower extremities, no edema, no calf tenderness.
SKIN:  Dry and intact.
NEUROLOGIC:  Moves all extremities bilaterally equally and spontaneously.  No 
focal deficit identified.
 
LABORATORY DATA:  The patient had chest x-ray on 07/30.  I repeated another 
chest x-ray last night as well.  I have reviewed both the films and report.  
There is increase in infiltrates and/or pulmonary vascular congestion on the 
second chest x-ray performed yesterday.  Arterial blood gas consistent with 
acute hypoxemic respiratory failure.
 
ASSESSMENT AND PLAN:
1.  Acute hypoxemic respiratory failure secondary to COVID-19. For now, needs 
BiPAP with 100% FiO2, can be off BiPAP while awake if able to tolerate.  O2 
saturations were in the 70s off BiPAP for now, though and needs BiPAP for now.  
Currently, she does appear to be stable on BiPAP.  We will try to avoid 
intubation.  She has enough IV access for now; however, may need more IV access 
later and therefore, I feel it will be safer to go ahead and place a PICC line.
2.  COVID-19, was already on dexamethasone.  I did increase the dose due to 
severe hypoxemia and started Rosalinda as well as remdesivir.  We will also go 
ahead and give her one unit of convalescent plasma.  We will consider another 
unit of convalescent plasma tomorrow.
3.  Pulmonary infiltrates.  She is on ceftriaxone and Zithromax as well, agree 
with the same and will continue.
4.  Elevated D-dimer. Ordered venous Dopplers, also ordered an echo.  She is too
unstable to do a CTA chest.  If she becomes stable enough, then I will perform 
the same. For now I increased Lovenox to intermediate dose.
5.  Hypertension/mild fluid overload/hypokalemia.  For now, recommend 
discontinuing Norvasc. If the blood pressure does rise then this will give us an
opportunity to give her more Lasix.  I did order one dose of Lasix with 
convalescent plasma today.  We will watch blood pressure closely.  We will 
continue to replace potassium and magnesium as needed.
6.  Clostridium difficile prophylaxis, Lactinex.
7.  Gastrointestinal prophylaxis, Protonix.
 
 
 
[address]  [address]                    CONSULTATION                  
_______________________________________________________________________________
 
Name:       ORALIA GUDINO                Room:           [address] [place]  
[facility]#:  [id number]      Account #:      [id number]  
Admission:  07/30/21     Attend Phys:    JUANCARLOS Pizano
Discharge:               Date of Birth:  [birth year]  
         Report #: [id number]
                                                                     [id number]
_______________________________________________________________________________
 
 
 
The patient is critically ill at this time.
 
Total time spent providing critical care to this patient today is 44 minutes.
 
 
 
 
 
 
 
 
 
 
 
 
 
 
 
 
 
 
 
 
 
 
 
 
 
 
 
 
 
 
 
 
 
 
 
 
 
 
<ELECTRONICALLY SIGNED>
                                        By:  Al Jones MD              
08/06/21     1025
D: 08/03/21 1127_______________________________________
T: 08/03/21 1158Aargenis Jones MD                 /nt

## 2021-08-07 VITALS — DIASTOLIC BLOOD PRESSURE: 68 MMHG | SYSTOLIC BLOOD PRESSURE: 126 MMHG

## 2021-08-07 VITALS — SYSTOLIC BLOOD PRESSURE: 170 MMHG | DIASTOLIC BLOOD PRESSURE: 69 MMHG

## 2021-08-07 VITALS — DIASTOLIC BLOOD PRESSURE: 72 MMHG | SYSTOLIC BLOOD PRESSURE: 139 MMHG

## 2021-08-07 VITALS — SYSTOLIC BLOOD PRESSURE: 132 MMHG | DIASTOLIC BLOOD PRESSURE: 60 MMHG

## 2021-08-07 VITALS — SYSTOLIC BLOOD PRESSURE: 129 MMHG | DIASTOLIC BLOOD PRESSURE: 63 MMHG

## 2021-08-07 VITALS — SYSTOLIC BLOOD PRESSURE: 126 MMHG | DIASTOLIC BLOOD PRESSURE: 68 MMHG

## 2021-08-07 LAB
ABSOLUTE MONOCYTES: 0.8 THOU/UL (ref 0–1.2)
ALBUMIN SERPL-MCNC: 2.7 G/DL (ref 3.4–5)
ALP SERPL-CCNC: 69 U/L (ref 46–116)
ALT SERPL-CCNC: 43 U/L (ref 30–65)
ANION GAP SERPL CALC-SCNC: 6 MMOL/L (ref 7–16)
AST SERPL-CCNC: 41 U/L (ref 15–37)
BILIRUB SERPL-MCNC: 0.4 MG/DL
BUN SERPL-MCNC: 24 MG/DL (ref 7–18)
CALCIUM SERPL-MCNC: 8.3 MG/DL (ref 8.5–10.1)
CHLORIDE SERPL-SCNC: 110 MMOL/L (ref 98–107)
CO2 SERPL-SCNC: 28 MMOL/L (ref 21–32)
CREAT SERPL-MCNC: 0.6 MG/DL (ref 0.6–1.3)
GLUCOSE SERPL-MCNC: 141 MG/DL (ref 70–99)
GRANULOCYTES NFR BLD MANUAL: 86 %
HCT VFR BLD CALC: 41 % (ref 37–47)
HGB BLD-MCNC: 13.8 GM/DL (ref 12–15)
LYMPHOCYTES # BLD: 0.7 THOU/UL (ref 0.8–5.3)
LYMPHOCYTES NFR BLD AUTO: 5 %
MCH RBC QN AUTO: 31.9 PG (ref 26–34)
MCHC RBC AUTO-ENTMCNC: 33.7 G/DL (ref 28–37)
MCV RBC: 94.9 FL (ref 80–100)
MONOCYTES NFR BLD: 6 %
MPV: 7.3 FL. (ref 7.2–11.1)
NEUTROPHILS # BLD: 12 THOU/UL (ref 1.6–8.1)
NEUTS BAND NFR BLD: 3 %
NUCLEATED RBCS: 0 /100WBC
PLATELET # BLD EST: ADEQUATE 10*3/UL
PLATELET COUNT*: 302 THOU/UL (ref 150–400)
POTASSIUM SERPL-SCNC: 4.1 MMOL/L (ref 3.5–5.1)
PREALB SERPL-MCNC: 20.6 MG/DL (ref 18–35.7)
PROT SERPL-MCNC: 5.6 G/DL (ref 6.4–8.2)
RBC # BLD AUTO: 4.32 MIL/UL (ref 4.2–5)
RBC MORPH BLD: NORMAL
RDW-CV: 12.8 % (ref 10.5–14.5)
SODIUM SERPL-SCNC: 144 MMOL/L (ref 136–145)
WBC # BLD AUTO: 13.5 THOU/UL (ref 4–11)

## 2021-08-07 PROCEDURE — 5A0935A ASSISTANCE WITH RESPIRATORY VENTILATION, LESS THAN 24 CONSECUTIVE HOURS, HIGH NASAL FLOW/VELOCITY: ICD-10-PCS | Performed by: INTERNAL MEDICINE

## 2021-08-07 PROCEDURE — 5A09357 ASSISTANCE WITH RESPIRATORY VENTILATION, LESS THAN 24 CONSECUTIVE HOURS, CONTINUOUS POSITIVE AIRWAY PRESSURE: ICD-10-PCS

## 2021-08-08 VITALS — SYSTOLIC BLOOD PRESSURE: 126 MMHG | DIASTOLIC BLOOD PRESSURE: 75 MMHG

## 2021-08-08 VITALS — DIASTOLIC BLOOD PRESSURE: 68 MMHG | SYSTOLIC BLOOD PRESSURE: 136 MMHG

## 2021-08-08 VITALS — DIASTOLIC BLOOD PRESSURE: 71 MMHG | SYSTOLIC BLOOD PRESSURE: 139 MMHG

## 2021-08-08 VITALS — DIASTOLIC BLOOD PRESSURE: 52 MMHG | SYSTOLIC BLOOD PRESSURE: 110 MMHG

## 2021-08-08 VITALS — SYSTOLIC BLOOD PRESSURE: 116 MMHG | DIASTOLIC BLOOD PRESSURE: 67 MMHG

## 2021-08-08 LAB
ABSOLUTE BASOPHILS: 0 THOU/UL (ref 0–0.2)
ABSOLUTE EOSINOPHILS: 0 THOU/UL (ref 0–0.7)
ABSOLUTE MONOCYTES: 0.4 THOU/UL (ref 0–1.2)
ALBUMIN SERPL-MCNC: 2.9 G/DL (ref 3.4–5)
ALP SERPL-CCNC: 87 U/L (ref 46–116)
ALT SERPL-CCNC: 49 U/L (ref 30–65)
ANION GAP SERPL CALC-SCNC: 6 MMOL/L (ref 7–16)
AST SERPL-CCNC: 41 U/L (ref 15–37)
BASOPHILS NFR BLD AUTO: 0 %
BILIRUB SERPL-MCNC: 0.5 MG/DL
BUN SERPL-MCNC: 28 MG/DL (ref 7–18)
CALCIUM SERPL-MCNC: 8.3 MG/DL (ref 8.5–10.1)
CHLORIDE SERPL-SCNC: 111 MMOL/L (ref 98–107)
CO2 SERPL-SCNC: 30 MMOL/L (ref 21–32)
CREAT SERPL-MCNC: 0.8 MG/DL (ref 0.6–1.3)
EOSINOPHIL NFR BLD: 0 %
GLUCOSE SERPL-MCNC: 164 MG/DL (ref 70–99)
GRANULOCYTES NFR BLD MANUAL: 93.6 %
HCT VFR BLD CALC: 41.1 % (ref 37–47)
HGB BLD-MCNC: 14.2 GM/DL (ref 12–15)
LYMPHOCYTES # BLD: 0.6 THOU/UL (ref 0.8–5.3)
LYMPHOCYTES NFR BLD AUTO: 4 %
MAGNESIUM SERPL-MCNC: 2.4 MG/DL (ref 1.8–2.4)
MCH RBC QN AUTO: 32.6 PG (ref 26–34)
MCHC RBC AUTO-ENTMCNC: 34.4 G/DL (ref 28–37)
MCV RBC: 94.6 FL (ref 80–100)
MONOCYTES NFR BLD: 2.4 %
MPV: 7.2 FL. (ref 7.2–11.1)
NEUTROPHILS # BLD: 14.6 THOU/UL (ref 1.6–8.1)
NUCLEATED RBCS: 0 /100WBC
PLATELET COUNT*: 329 THOU/UL (ref 150–400)
POTASSIUM SERPL-SCNC: 3.9 MMOL/L (ref 3.5–5.1)
PROT SERPL-MCNC: 6.1 G/DL (ref 6.4–8.2)
RBC # BLD AUTO: 4.35 MIL/UL (ref 4.2–5)
RDW-CV: 13.1 % (ref 10.5–14.5)
SODIUM SERPL-SCNC: 147 MMOL/L (ref 136–145)
WBC # BLD AUTO: 15.7 THOU/UL (ref 4–11)

## 2021-08-08 PROCEDURE — 5A09357 ASSISTANCE WITH RESPIRATORY VENTILATION, LESS THAN 24 CONSECUTIVE HOURS, CONTINUOUS POSITIVE AIRWAY PRESSURE: ICD-10-PCS

## 2021-08-08 PROCEDURE — B548ZZA ULTRASONOGRAPHY OF SUPERIOR VENA CAVA, GUIDANCE: ICD-10-PCS | Performed by: INTERNAL MEDICINE

## 2021-08-08 PROCEDURE — 5A0935A ASSISTANCE WITH RESPIRATORY VENTILATION, LESS THAN 24 CONSECUTIVE HOURS, HIGH NASAL FLOW/VELOCITY: ICD-10-PCS

## 2021-08-08 PROCEDURE — 02HV33Z INSERTION OF INFUSION DEVICE INTO SUPERIOR VENA CAVA, PERCUTANEOUS APPROACH: ICD-10-PCS | Performed by: RADIOLOGY

## 2021-08-08 PROCEDURE — B5181ZA FLUOROSCOPY OF SUPERIOR VENA CAVA USING LOW OSMOLAR CONTRAST, GUIDANCE: ICD-10-PCS

## 2021-08-09 VITALS — SYSTOLIC BLOOD PRESSURE: 125 MMHG | DIASTOLIC BLOOD PRESSURE: 62 MMHG

## 2021-08-09 VITALS — SYSTOLIC BLOOD PRESSURE: 143 MMHG | DIASTOLIC BLOOD PRESSURE: 66 MMHG

## 2021-08-09 VITALS — DIASTOLIC BLOOD PRESSURE: 68 MMHG | SYSTOLIC BLOOD PRESSURE: 133 MMHG

## 2021-08-09 VITALS — DIASTOLIC BLOOD PRESSURE: 72 MMHG | SYSTOLIC BLOOD PRESSURE: 130 MMHG

## 2021-08-09 VITALS — DIASTOLIC BLOOD PRESSURE: 68 MMHG | SYSTOLIC BLOOD PRESSURE: 145 MMHG

## 2021-08-09 VITALS — SYSTOLIC BLOOD PRESSURE: 129 MMHG | DIASTOLIC BLOOD PRESSURE: 63 MMHG

## 2021-08-09 LAB
ABSOLUTE BASOPHILS: 0 THOU/UL (ref 0–0.2)
ABSOLUTE EOSINOPHILS: 0 THOU/UL (ref 0–0.7)
ABSOLUTE MONOCYTES: 0.3 THOU/UL (ref 0–1.2)
ALBUMIN SERPL-MCNC: 3.2 G/DL (ref 3.4–5)
ALP SERPL-CCNC: 91 U/L (ref 46–116)
ALT SERPL-CCNC: 40 U/L (ref 30–65)
ANION GAP SERPL CALC-SCNC: 6 MMOL/L (ref 7–16)
AST SERPL-CCNC: 36 U/L (ref 15–37)
BASOPHILS NFR BLD AUTO: 0.1 %
BILIRUB SERPL-MCNC: 0.5 MG/DL
BUN SERPL-MCNC: 27 MG/DL (ref 7–18)
CALCIUM SERPL-MCNC: 8.3 MG/DL (ref 8.5–10.1)
CHLORIDE SERPL-SCNC: 105 MMOL/L (ref 98–107)
CO2 SERPL-SCNC: 30 MMOL/L (ref 21–32)
CREAT SERPL-MCNC: 0.7 MG/DL (ref 0.6–1.3)
EOSINOPHIL NFR BLD: 0 %
GLUCOSE SERPL-MCNC: 154 MG/DL (ref 70–99)
GRANULOCYTES NFR BLD MANUAL: 94.6 %
HCT VFR BLD CALC: 37.8 % (ref 37–47)
HGB BLD-MCNC: 13.1 GM/DL (ref 12–15)
LYMPHOCYTES # BLD: 0.5 THOU/UL (ref 0.8–5.3)
LYMPHOCYTES NFR BLD AUTO: 3.4 %
MAGNESIUM SERPL-MCNC: 2.1 MG/DL (ref 1.8–2.4)
MCH RBC QN AUTO: 32.8 PG (ref 26–34)
MCHC RBC AUTO-ENTMCNC: 34.8 G/DL (ref 28–37)
MCV RBC: 94.2 FL (ref 80–100)
MONOCYTES NFR BLD: 1.9 %
MPV: 7.1 FL. (ref 7.2–11.1)
NEUTROPHILS # BLD: 14.4 THOU/UL (ref 1.6–8.1)
NUCLEATED RBCS: 0 /100WBC
PLATELET COUNT*: 294 THOU/UL (ref 150–400)
POTASSIUM SERPL-SCNC: 4 MMOL/L (ref 3.5–5.1)
PROT SERPL-MCNC: 5.9 G/DL (ref 6.4–8.2)
RBC # BLD AUTO: 4.01 MIL/UL (ref 4.2–5)
RDW-CV: 13.2 % (ref 10.5–14.5)
SODIUM SERPL-SCNC: 141 MMOL/L (ref 136–145)
WBC # BLD AUTO: 15.2 THOU/UL (ref 4–11)

## 2021-08-09 PROCEDURE — 5A0935A ASSISTANCE WITH RESPIRATORY VENTILATION, LESS THAN 24 CONSECUTIVE HOURS, HIGH NASAL FLOW/VELOCITY: ICD-10-PCS | Performed by: INTERNAL MEDICINE

## 2021-08-09 PROCEDURE — 5A09357 ASSISTANCE WITH RESPIRATORY VENTILATION, LESS THAN 24 CONSECUTIVE HOURS, CONTINUOUS POSITIVE AIRWAY PRESSURE: ICD-10-PCS

## 2021-08-10 VITALS — DIASTOLIC BLOOD PRESSURE: 64 MMHG | SYSTOLIC BLOOD PRESSURE: 129 MMHG

## 2021-08-10 VITALS — DIASTOLIC BLOOD PRESSURE: 58 MMHG | SYSTOLIC BLOOD PRESSURE: 133 MMHG

## 2021-08-10 VITALS — DIASTOLIC BLOOD PRESSURE: 60 MMHG | SYSTOLIC BLOOD PRESSURE: 131 MMHG

## 2021-08-10 VITALS — DIASTOLIC BLOOD PRESSURE: 60 MMHG | SYSTOLIC BLOOD PRESSURE: 124 MMHG

## 2021-08-10 VITALS — DIASTOLIC BLOOD PRESSURE: 73 MMHG | SYSTOLIC BLOOD PRESSURE: 137 MMHG

## 2021-08-10 VITALS — SYSTOLIC BLOOD PRESSURE: 142 MMHG | DIASTOLIC BLOOD PRESSURE: 68 MMHG

## 2021-08-10 LAB
ABSOLUTE BASOPHILS: 0 THOU/UL (ref 0–0.2)
ABSOLUTE EOSINOPHILS: 0 THOU/UL (ref 0–0.7)
ABSOLUTE MONOCYTES: 0.2 THOU/UL (ref 0–1.2)
ANION GAP SERPL CALC-SCNC: 7 MMOL/L (ref 7–16)
BASOPHILS NFR BLD AUTO: 0.1 %
BUN SERPL-MCNC: 26 MG/DL (ref 7–18)
CALCIUM SERPL-MCNC: 8.6 MG/DL (ref 8.5–10.1)
CHLORIDE SERPL-SCNC: 102 MMOL/L (ref 98–107)
CO2 SERPL-SCNC: 29 MMOL/L (ref 21–32)
CREAT SERPL-MCNC: 0.7 MG/DL (ref 0.6–1.3)
EOSINOPHIL NFR BLD: 0 %
GLUCOSE SERPL-MCNC: 246 MG/DL (ref 70–99)
GRANULOCYTES NFR BLD MANUAL: 94.1 %
HCT VFR BLD CALC: 37.2 % (ref 37–47)
HGB BLD-MCNC: 13 GM/DL (ref 12–15)
LYMPHOCYTES # BLD: 0.7 THOU/UL (ref 0.8–5.3)
LYMPHOCYTES NFR BLD AUTO: 4.6 %
MCH RBC QN AUTO: 33.4 PG (ref 26–34)
MCHC RBC AUTO-ENTMCNC: 34.9 G/DL (ref 28–37)
MCV RBC: 95.8 FL (ref 80–100)
MONOCYTES NFR BLD: 1.2 %
MPV: 7.6 FL. (ref 7.2–11.1)
NEUTROPHILS # BLD: 13.6 THOU/UL (ref 1.6–8.1)
NUCLEATED RBCS: 0 /100WBC
PLATELET COUNT*: 268 THOU/UL (ref 150–400)
POTASSIUM SERPL-SCNC: 3.9 MMOL/L (ref 3.5–5.1)
RBC # BLD AUTO: 3.88 MIL/UL (ref 4.2–5)
RDW-CV: 13.4 % (ref 10.5–14.5)
SODIUM SERPL-SCNC: 138 MMOL/L (ref 136–145)
WBC # BLD AUTO: 14.4 THOU/UL (ref 4–11)

## 2021-08-11 VITALS — DIASTOLIC BLOOD PRESSURE: 79 MMHG | SYSTOLIC BLOOD PRESSURE: 163 MMHG

## 2021-08-11 VITALS — SYSTOLIC BLOOD PRESSURE: 131 MMHG | DIASTOLIC BLOOD PRESSURE: 52 MMHG

## 2021-08-11 VITALS — DIASTOLIC BLOOD PRESSURE: 73 MMHG | SYSTOLIC BLOOD PRESSURE: 150 MMHG

## 2021-08-11 VITALS — SYSTOLIC BLOOD PRESSURE: 132 MMHG | DIASTOLIC BLOOD PRESSURE: 63 MMHG

## 2021-08-11 VITALS — SYSTOLIC BLOOD PRESSURE: 133 MMHG | DIASTOLIC BLOOD PRESSURE: 62 MMHG

## 2021-08-11 LAB
ABSOLUTE MONOCYTES: 0.3 THOU/UL (ref 0–1.2)
ALBUMIN SERPL-MCNC: 3.9 G/DL (ref 3.4–5)
ALP SERPL-CCNC: 120 U/L (ref 46–116)
ALT SERPL-CCNC: 35 U/L (ref 30–65)
ANION GAP SERPL CALC-SCNC: 9 MMOL/L (ref 7–16)
AST SERPL-CCNC: 34 U/L (ref 15–37)
BILIRUB SERPL-MCNC: 0.7 MG/DL
BUN SERPL-MCNC: 29 MG/DL (ref 7–18)
CALCIUM SERPL-MCNC: 8.9 MG/DL (ref 8.5–10.1)
CHLORIDE SERPL-SCNC: 107 MMOL/L (ref 98–107)
CO2 SERPL-SCNC: 28 MMOL/L (ref 21–32)
CREAT SERPL-MCNC: 0.6 MG/DL (ref 0.6–1.3)
GLUCOSE SERPL-MCNC: 159 MG/DL (ref 70–99)
GRANULOCYTES NFR BLD MANUAL: 91 %
HCT VFR BLD CALC: 39.5 % (ref 37–47)
HGB BLD-MCNC: 13.3 GM/DL (ref 12–15)
LYMPHOCYTES # BLD: 0.7 THOU/UL (ref 0.8–5.3)
LYMPHOCYTES NFR BLD AUTO: 5 %
MAGNESIUM SERPL-MCNC: 2.4 MG/DL (ref 1.8–2.4)
MCH RBC QN AUTO: 32.6 PG (ref 26–34)
MCHC RBC AUTO-ENTMCNC: 33.7 G/DL (ref 28–37)
MCV RBC: 96.5 FL (ref 80–100)
METAMYELOCYTES NFR BLD: 1 %
MONOCYTES NFR BLD: 2 %
MPV: 7.7 FL. (ref 7.2–11.1)
NEUTROPHILS # BLD: 12.8 THOU/UL (ref 1.6–8.1)
NEUTS BAND NFR BLD: 1 %
NUCLEATED RBCS: 0 /100WBC
PHOSPHATE SERPL-MCNC: 3.6 MG/DL (ref 2.5–4.9)
PLATELET # BLD EST: ADEQUATE 10*3/UL
PLATELET COUNT*: 265 THOU/UL (ref 150–400)
POTASSIUM SERPL-SCNC: 4.3 MMOL/L (ref 3.5–5.1)
PROT SERPL-MCNC: 6.4 G/DL (ref 6.4–8.2)
RBC # BLD AUTO: 4.09 MIL/UL (ref 4.2–5)
RBC MORPH BLD: NORMAL
RDW-CV: 13.1 % (ref 10.5–14.5)
SODIUM SERPL-SCNC: 144 MMOL/L (ref 136–145)
WBC # BLD AUTO: 13.8 THOU/UL (ref 4–11)

## 2021-08-11 PROCEDURE — 5A09457 ASSISTANCE WITH RESPIRATORY VENTILATION, 24-96 CONSECUTIVE HOURS, CONTINUOUS POSITIVE AIRWAY PRESSURE: ICD-10-PCS

## 2021-08-11 PROCEDURE — 5A0935A ASSISTANCE WITH RESPIRATORY VENTILATION, LESS THAN 24 CONSECUTIVE HOURS, HIGH NASAL FLOW/VELOCITY: ICD-10-PCS | Performed by: INTERNAL MEDICINE

## 2021-08-12 VITALS — SYSTOLIC BLOOD PRESSURE: 147 MMHG | DIASTOLIC BLOOD PRESSURE: 79 MMHG

## 2021-08-12 VITALS — SYSTOLIC BLOOD PRESSURE: 146 MMHG | DIASTOLIC BLOOD PRESSURE: 78 MMHG

## 2021-08-12 VITALS — SYSTOLIC BLOOD PRESSURE: 148 MMHG | DIASTOLIC BLOOD PRESSURE: 75 MMHG

## 2021-08-12 VITALS — DIASTOLIC BLOOD PRESSURE: 62 MMHG | SYSTOLIC BLOOD PRESSURE: 130 MMHG

## 2021-08-12 LAB
ABSOLUTE BASOPHILS: 0.1 THOU/UL (ref 0–0.2)
ABSOLUTE EOSINOPHILS: 0.2 THOU/UL (ref 0–0.7)
ABSOLUTE MONOCYTES: 0.2 THOU/UL (ref 0–1.2)
ANION GAP SERPL CALC-SCNC: 6 MMOL/L (ref 7–16)
BASOPHILS NFR BLD AUTO: 0.5 %
BUN SERPL-MCNC: 30 MG/DL (ref 7–18)
CALCIUM SERPL-MCNC: 8.7 MG/DL (ref 8.5–10.1)
CHLORIDE SERPL-SCNC: 105 MMOL/L (ref 98–107)
CO2 SERPL-SCNC: 31 MMOL/L (ref 21–32)
CREAT SERPL-MCNC: 0.7 MG/DL (ref 0.6–1.3)
EOSINOPHIL NFR BLD: 0.8 %
GLUCOSE SERPL-MCNC: 128 MG/DL (ref 70–99)
GRANULOCYTES NFR BLD MANUAL: 95.3 %
HCT VFR BLD CALC: 41.9 % (ref 37–47)
HGB BLD-MCNC: 14 GM/DL (ref 12–15)
LYMPHOCYTES # BLD: 0.5 THOU/UL (ref 0.8–5.3)
LYMPHOCYTES NFR BLD AUTO: 2.6 %
MAGNESIUM SERPL-MCNC: 2.3 MG/DL (ref 1.8–2.4)
MCH RBC QN AUTO: 32 PG (ref 26–34)
MCHC RBC AUTO-ENTMCNC: 33.5 G/DL (ref 28–37)
MCV RBC: 95.5 FL (ref 80–100)
MONOCYTES NFR BLD: 0.8 %
MPV: 7.6 FL. (ref 7.2–11.1)
NEUTROPHILS # BLD: 19.9 THOU/UL (ref 1.6–8.1)
NUCLEATED RBCS: 0 /100WBC
PLATELET COUNT*: 241 THOU/UL (ref 150–400)
POTASSIUM SERPL-SCNC: 4.6 MMOL/L (ref 3.5–5.1)
RBC # BLD AUTO: 4.39 MIL/UL (ref 4.2–5)
RDW-CV: 13.5 % (ref 10.5–14.5)
SODIUM SERPL-SCNC: 142 MMOL/L (ref 136–145)
WBC # BLD AUTO: 20.9 THOU/UL (ref 4–11)

## 2021-08-12 PROCEDURE — 0W9B00Z DRAINAGE OF LEFT PLEURAL CAVITY WITH DRAINAGE DEVICE, OPEN APPROACH: ICD-10-PCS | Performed by: SURGERY

## 2021-08-13 VITALS — SYSTOLIC BLOOD PRESSURE: 137 MMHG | DIASTOLIC BLOOD PRESSURE: 81 MMHG

## 2021-08-13 VITALS — SYSTOLIC BLOOD PRESSURE: 135 MMHG | DIASTOLIC BLOOD PRESSURE: 79 MMHG

## 2021-08-13 VITALS — DIASTOLIC BLOOD PRESSURE: 72 MMHG | SYSTOLIC BLOOD PRESSURE: 137 MMHG

## 2021-08-13 VITALS — DIASTOLIC BLOOD PRESSURE: 73 MMHG | SYSTOLIC BLOOD PRESSURE: 144 MMHG

## 2021-08-13 VITALS — SYSTOLIC BLOOD PRESSURE: 137 MMHG | DIASTOLIC BLOOD PRESSURE: 74 MMHG

## 2021-08-13 VITALS — SYSTOLIC BLOOD PRESSURE: 146 MMHG | DIASTOLIC BLOOD PRESSURE: 80 MMHG

## 2021-08-13 LAB
ABSOLUTE BASOPHILS: 0 THOU/UL (ref 0–0.2)
ABSOLUTE EOSINOPHILS: 0 THOU/UL (ref 0–0.7)
ABSOLUTE MONOCYTES: 0.3 THOU/UL (ref 0–1.2)
ALBUMIN SERPL-MCNC: 3.7 G/DL (ref 3.4–5)
ALP SERPL-CCNC: 188 U/L (ref 46–116)
ALT SERPL-CCNC: 35 U/L (ref 30–65)
ANION GAP SERPL CALC-SCNC: 10 MMOL/L (ref 7–16)
APTT BLD: 26.1 SECONDS (ref 25–31.3)
AST SERPL-CCNC: 50 U/L (ref 15–37)
BASOPHILS NFR BLD AUTO: 0.1 %
BILIRUB SERPL-MCNC: 0.6 MG/DL
BUN SERPL-MCNC: 35 MG/DL (ref 7–18)
CALCIUM SERPL-MCNC: 9.1 MG/DL (ref 8.5–10.1)
CHLORIDE SERPL-SCNC: 108 MMOL/L (ref 98–107)
CO2 SERPL-SCNC: 27 MMOL/L (ref 21–32)
CREAT SERPL-MCNC: 0.8 MG/DL (ref 0.6–1.3)
EOSINOPHIL NFR BLD: 0.1 %
GLUCOSE SERPL-MCNC: 167 MG/DL (ref 70–99)
GRANULOCYTES NFR BLD MANUAL: 96 %
HCT VFR BLD CALC: 44 % (ref 37–47)
HGB BLD-MCNC: 14.5 GM/DL (ref 12–15)
INR PPP: 1.1
LYMPHOCYTES # BLD: 0.5 THOU/UL (ref 0.8–5.3)
LYMPHOCYTES NFR BLD AUTO: 2.3 %
MAGNESIUM SERPL-MCNC: 2.6 MG/DL (ref 1.8–2.4)
MCH RBC QN AUTO: 31.9 PG (ref 26–34)
MCHC RBC AUTO-ENTMCNC: 33 G/DL (ref 28–37)
MCV RBC: 96.9 FL (ref 80–100)
MONOCYTES NFR BLD: 1.5 %
MPV: 7.9 FL. (ref 7.2–11.1)
NEUTROPHILS # BLD: 20.3 THOU/UL (ref 1.6–8.1)
NUCLEATED RBCS: 0 /100WBC
PLATELET COUNT*: 212 THOU/UL (ref 150–400)
POTASSIUM SERPL-SCNC: 4.9 MMOL/L (ref 3.5–5.1)
PROT SERPL-MCNC: 6.6 G/DL (ref 6.4–8.2)
PROTHROMBIN TIME: 11.7 SECONDS (ref 9.2–11.5)
RBC # BLD AUTO: 4.54 MIL/UL (ref 4.2–5)
RDW-CV: 13.5 % (ref 10.5–14.5)
SODIUM SERPL-SCNC: 145 MMOL/L (ref 136–145)
WBC # BLD AUTO: 21.2 THOU/UL (ref 4–11)

## 2021-08-14 VITALS — SYSTOLIC BLOOD PRESSURE: 154 MMHG | DIASTOLIC BLOOD PRESSURE: 72 MMHG

## 2021-08-14 VITALS — SYSTOLIC BLOOD PRESSURE: 135 MMHG | DIASTOLIC BLOOD PRESSURE: 66 MMHG

## 2021-08-14 VITALS — SYSTOLIC BLOOD PRESSURE: 147 MMHG | DIASTOLIC BLOOD PRESSURE: 79 MMHG

## 2021-08-14 VITALS — SYSTOLIC BLOOD PRESSURE: 145 MMHG | DIASTOLIC BLOOD PRESSURE: 80 MMHG

## 2021-08-14 VITALS — SYSTOLIC BLOOD PRESSURE: 135 MMHG | DIASTOLIC BLOOD PRESSURE: 71 MMHG

## 2021-08-14 VITALS — SYSTOLIC BLOOD PRESSURE: 138 MMHG | DIASTOLIC BLOOD PRESSURE: 80 MMHG

## 2021-08-14 LAB
ALBUMIN SERPL-MCNC: 3.5 G/DL (ref 3.4–5)
ALP SERPL-CCNC: 180 U/L (ref 46–116)
ALT SERPL-CCNC: 36 U/L (ref 30–65)
ANION GAP SERPL CALC-SCNC: 7 MMOL/L (ref 7–16)
AST SERPL-CCNC: 34 U/L (ref 15–37)
BILIRUB SERPL-MCNC: 0.8 MG/DL
BUN SERPL-MCNC: 36 MG/DL (ref 7–18)
CALCIUM SERPL-MCNC: 8.6 MG/DL (ref 8.5–10.1)
CHLORIDE SERPL-SCNC: 106 MMOL/L (ref 98–107)
CO2 SERPL-SCNC: 30 MMOL/L (ref 21–32)
CREAT SERPL-MCNC: 0.8 MG/DL (ref 0.6–1.3)
GLUCOSE SERPL-MCNC: 139 MG/DL (ref 70–99)
HCT VFR BLD CALC: 42.6 % (ref 37–47)
HGB BLD-MCNC: 14.2 GM/DL (ref 12–15)
MAGNESIUM SERPL-MCNC: 2.5 MG/DL (ref 1.8–2.4)
MCH RBC QN AUTO: 32.5 PG (ref 26–34)
MCHC RBC AUTO-ENTMCNC: 33.4 G/DL (ref 28–37)
MCV RBC: 97.1 FL (ref 80–100)
MPV: 7.9 FL. (ref 7.2–11.1)
PLATELET COUNT*: 117 THOU/UL (ref 150–400)
POTASSIUM SERPL-SCNC: 4.6 MMOL/L (ref 3.5–5.1)
PROT SERPL-MCNC: 6.2 G/DL (ref 6.4–8.2)
RBC # BLD AUTO: 4.39 MIL/UL (ref 4.2–5)
RDW-CV: 13.6 % (ref 10.5–14.5)
SODIUM SERPL-SCNC: 143 MMOL/L (ref 136–145)
WBC # BLD AUTO: 21.1 THOU/UL (ref 4–11)

## 2021-08-15 VITALS — SYSTOLIC BLOOD PRESSURE: 150 MMHG | DIASTOLIC BLOOD PRESSURE: 69 MMHG

## 2021-08-15 VITALS — SYSTOLIC BLOOD PRESSURE: 123 MMHG | DIASTOLIC BLOOD PRESSURE: 62 MMHG

## 2021-08-15 VITALS — DIASTOLIC BLOOD PRESSURE: 77 MMHG | SYSTOLIC BLOOD PRESSURE: 143 MMHG

## 2021-08-15 VITALS — DIASTOLIC BLOOD PRESSURE: 85 MMHG | SYSTOLIC BLOOD PRESSURE: 144 MMHG

## 2021-08-15 VITALS — SYSTOLIC BLOOD PRESSURE: 134 MMHG | DIASTOLIC BLOOD PRESSURE: 70 MMHG

## 2021-08-15 VITALS — SYSTOLIC BLOOD PRESSURE: 143 MMHG | DIASTOLIC BLOOD PRESSURE: 74 MMHG

## 2021-08-16 VITALS — SYSTOLIC BLOOD PRESSURE: 136 MMHG | DIASTOLIC BLOOD PRESSURE: 78 MMHG

## 2021-08-16 VITALS — DIASTOLIC BLOOD PRESSURE: 79 MMHG | SYSTOLIC BLOOD PRESSURE: 131 MMHG

## 2021-08-16 VITALS — SYSTOLIC BLOOD PRESSURE: 140 MMHG | DIASTOLIC BLOOD PRESSURE: 81 MMHG

## 2021-08-16 VITALS — SYSTOLIC BLOOD PRESSURE: 118 MMHG | DIASTOLIC BLOOD PRESSURE: 62 MMHG

## 2021-08-16 VITALS — SYSTOLIC BLOOD PRESSURE: 136 MMHG | DIASTOLIC BLOOD PRESSURE: 77 MMHG

## 2021-08-16 LAB
ANION GAP SERPL CALC-SCNC: 6 MMOL/L (ref 7–16)
BE: 3.6 MMOL/L
BUN SERPL-MCNC: 32 MG/DL (ref 7–18)
CALCIUM SERPL-MCNC: 8.9 MG/DL (ref 8.5–10.1)
CHLORIDE SERPL-SCNC: 105 MMOL/L (ref 98–107)
CO2 SERPL-SCNC: 31 MMOL/L (ref 21–32)
CREAT SERPL-MCNC: 0.6 MG/DL (ref 0.6–1.3)
GLUCOSE SERPL-MCNC: 147 MG/DL (ref 70–99)
HCT VFR BLD CALC: 42.2 % (ref 37–47)
HGB BLD-MCNC: 14.2 GM/DL (ref 12–15)
MCH RBC QN AUTO: 32.6 PG (ref 26–34)
MCHC RBC AUTO-ENTMCNC: 33.7 G/DL (ref 28–37)
MCV RBC: 96.7 FL (ref 80–100)
MPV: 8.5 FL. (ref 7.2–11.1)
PCO2 BLD: 44.3 MMHG (ref 35–45)
PLATELET COUNT*: 92 THOU/UL (ref 150–400)
PO2 BLD: 76.8 MMHG (ref 75–100)
POTASSIUM SERPL-SCNC: 5 MMOL/L (ref 3.5–5.1)
RBC # BLD AUTO: 4.37 MIL/UL (ref 4.2–5)
RDW-CV: 13.3 % (ref 10.5–14.5)
SODIUM SERPL-SCNC: 142 MMOL/L (ref 136–145)
WBC # BLD AUTO: 21.2 THOU/UL (ref 4–11)

## 2021-08-16 PROCEDURE — 5A0935A ASSISTANCE WITH RESPIRATORY VENTILATION, LESS THAN 24 CONSECUTIVE HOURS, HIGH NASAL FLOW/VELOCITY: ICD-10-PCS | Performed by: INTERNAL MEDICINE

## 2021-08-16 PROCEDURE — 5A09357 ASSISTANCE WITH RESPIRATORY VENTILATION, LESS THAN 24 CONSECUTIVE HOURS, CONTINUOUS POSITIVE AIRWAY PRESSURE: ICD-10-PCS

## 2021-08-17 VITALS — DIASTOLIC BLOOD PRESSURE: 71 MMHG | SYSTOLIC BLOOD PRESSURE: 132 MMHG

## 2021-08-17 VITALS — SYSTOLIC BLOOD PRESSURE: 140 MMHG | DIASTOLIC BLOOD PRESSURE: 78 MMHG

## 2021-08-17 VITALS — SYSTOLIC BLOOD PRESSURE: 140 MMHG | DIASTOLIC BLOOD PRESSURE: 77 MMHG

## 2021-08-17 VITALS — DIASTOLIC BLOOD PRESSURE: 82 MMHG | SYSTOLIC BLOOD PRESSURE: 119 MMHG

## 2021-08-17 VITALS — SYSTOLIC BLOOD PRESSURE: 130 MMHG | DIASTOLIC BLOOD PRESSURE: 68 MMHG

## 2021-08-17 LAB
ABSOLUTE MONOCYTES: 0.7 THOU/UL (ref 0–1.2)
ALBUMIN SERPL-MCNC: 3.2 G/DL (ref 3.4–5)
ALP SERPL-CCNC: 174 U/L (ref 46–116)
ALT SERPL-CCNC: 71 U/L (ref 30–65)
ANION GAP SERPL CALC-SCNC: 7 MMOL/L (ref 7–16)
ANISOCYTOSIS BLD QL SMEAR: (no result)
AST SERPL-CCNC: 38 U/L (ref 15–37)
BILIRUB SERPL-MCNC: 0.7 MG/DL
BUN SERPL-MCNC: 30 MG/DL (ref 7–18)
CALCIUM SERPL-MCNC: 8.2 MG/DL (ref 8.5–10.1)
CHLORIDE SERPL-SCNC: 102 MMOL/L (ref 98–107)
CO2 SERPL-SCNC: 30 MMOL/L (ref 21–32)
CREAT SERPL-MCNC: 0.6 MG/DL (ref 0.6–1.3)
GLUCOSE SERPL-MCNC: 127 MG/DL (ref 70–99)
GRANULOCYTES NFR BLD MANUAL: 92 %
HCT VFR BLD CALC: 42.5 % (ref 37–47)
HGB BLD-MCNC: 14.2 GM/DL (ref 12–15)
LYMPHOCYTES # BLD: 0.7 THOU/UL (ref 0.8–5.3)
LYMPHOCYTES NFR BLD AUTO: 3 %
MAGNESIUM SERPL-MCNC: 2.4 MG/DL (ref 1.8–2.4)
MCH RBC QN AUTO: 32.5 PG (ref 26–34)
MCHC RBC AUTO-ENTMCNC: 33.4 G/DL (ref 28–37)
MCV RBC: 97.3 FL (ref 80–100)
MONOCYTES NFR BLD: 3 %
MPV: 9 FL. (ref 7.2–11.1)
NEUTROPHILS # BLD: 21.1 THOU/UL (ref 1.6–8.1)
NEUTS BAND NFR BLD: 2 %
NUCLEATED RBCS: 0 /100WBC
PHOSPHATE SERPL-MCNC: 4.6 MG/DL (ref 2.5–4.9)
PLATELET # BLD EST: (no result) 10*3/UL
PLATELET COUNT*: 91 THOU/UL (ref 150–400)
POIKILOCYTOSIS BLD QL SMEAR: (no result)
POTASSIUM SERPL-SCNC: 4.6 MMOL/L (ref 3.5–5.1)
PROT SERPL-MCNC: 6 G/DL (ref 6.4–8.2)
RBC # BLD AUTO: 4.36 MIL/UL (ref 4.2–5)
RDW-CV: 13.6 % (ref 10.5–14.5)
SODIUM SERPL-SCNC: 139 MMOL/L (ref 136–145)
WBC # BLD AUTO: 22.4 THOU/UL (ref 4–11)

## 2021-08-18 VITALS — DIASTOLIC BLOOD PRESSURE: 82 MMHG | SYSTOLIC BLOOD PRESSURE: 134 MMHG

## 2021-08-18 VITALS — DIASTOLIC BLOOD PRESSURE: 81 MMHG | SYSTOLIC BLOOD PRESSURE: 142 MMHG

## 2021-08-18 VITALS — SYSTOLIC BLOOD PRESSURE: 135 MMHG | DIASTOLIC BLOOD PRESSURE: 76 MMHG

## 2021-08-18 VITALS — SYSTOLIC BLOOD PRESSURE: 131 MMHG | DIASTOLIC BLOOD PRESSURE: 80 MMHG

## 2021-08-18 VITALS — DIASTOLIC BLOOD PRESSURE: 81 MMHG | SYSTOLIC BLOOD PRESSURE: 147 MMHG

## 2021-08-18 LAB
ANION GAP SERPL CALC-SCNC: 6 MMOL/L (ref 7–16)
BE: 3.3 MMOL/L
BUN SERPL-MCNC: 34 MG/DL (ref 7–18)
CALCIUM SERPL-MCNC: 8.6 MG/DL (ref 8.5–10.1)
CHLORIDE SERPL-SCNC: 100 MMOL/L (ref 98–107)
CO2 SERPL-SCNC: 32 MMOL/L (ref 21–32)
CREAT SERPL-MCNC: 0.6 MG/DL (ref 0.6–1.3)
GLUCOSE SERPL-MCNC: 137 MG/DL (ref 70–99)
HCT VFR BLD CALC: 41.7 % (ref 37–47)
HGB BLD-MCNC: 14 GM/DL (ref 12–15)
MAGNESIUM SERPL-MCNC: 2.5 MG/DL (ref 1.8–2.4)
MCH RBC QN AUTO: 32.4 PG (ref 26–34)
MCHC RBC AUTO-ENTMCNC: 33.5 G/DL (ref 28–37)
MCV RBC: 96.7 FL (ref 80–100)
MPV: 8.8 FL. (ref 7.2–11.1)
PCO2 BLD: 48.9 MMHG (ref 35–45)
PLATELET COUNT*: 91 THOU/UL (ref 150–400)
PO2 BLD: 106.7 MMHG (ref 75–100)
POTASSIUM SERPL-SCNC: 4.4 MMOL/L (ref 3.5–5.1)
RBC # BLD AUTO: 4.31 MIL/UL (ref 4.2–5)
RDW-CV: 13.3 % (ref 10.5–14.5)
SODIUM SERPL-SCNC: 138 MMOL/L (ref 136–145)
WBC # BLD AUTO: 22.6 THOU/UL (ref 4–11)

## 2021-08-18 PROCEDURE — 5A0935A ASSISTANCE WITH RESPIRATORY VENTILATION, LESS THAN 24 CONSECUTIVE HOURS, HIGH NASAL FLOW/VELOCITY: ICD-10-PCS | Performed by: INTERNAL MEDICINE

## 2021-08-19 VITALS — DIASTOLIC BLOOD PRESSURE: 60 MMHG | SYSTOLIC BLOOD PRESSURE: 109 MMHG

## 2021-08-19 VITALS — DIASTOLIC BLOOD PRESSURE: 68 MMHG | SYSTOLIC BLOOD PRESSURE: 113 MMHG

## 2021-08-19 VITALS — SYSTOLIC BLOOD PRESSURE: 108 MMHG | DIASTOLIC BLOOD PRESSURE: 58 MMHG

## 2021-08-19 VITALS — DIASTOLIC BLOOD PRESSURE: 56 MMHG | SYSTOLIC BLOOD PRESSURE: 101 MMHG

## 2021-08-19 VITALS — SYSTOLIC BLOOD PRESSURE: 105 MMHG | DIASTOLIC BLOOD PRESSURE: 59 MMHG

## 2021-08-19 VITALS — SYSTOLIC BLOOD PRESSURE: 127 MMHG | DIASTOLIC BLOOD PRESSURE: 72 MMHG

## 2021-08-19 VITALS — DIASTOLIC BLOOD PRESSURE: 66 MMHG | SYSTOLIC BLOOD PRESSURE: 110 MMHG

## 2021-08-19 VITALS — DIASTOLIC BLOOD PRESSURE: 62 MMHG | SYSTOLIC BLOOD PRESSURE: 110 MMHG

## 2021-08-19 VITALS — SYSTOLIC BLOOD PRESSURE: 102 MMHG | DIASTOLIC BLOOD PRESSURE: 58 MMHG

## 2021-08-19 VITALS — DIASTOLIC BLOOD PRESSURE: 79 MMHG | SYSTOLIC BLOOD PRESSURE: 123 MMHG

## 2021-08-19 VITALS — DIASTOLIC BLOOD PRESSURE: 57 MMHG | SYSTOLIC BLOOD PRESSURE: 109 MMHG

## 2021-08-19 VITALS — SYSTOLIC BLOOD PRESSURE: 117 MMHG | DIASTOLIC BLOOD PRESSURE: 72 MMHG

## 2021-08-19 VITALS — SYSTOLIC BLOOD PRESSURE: 106 MMHG | DIASTOLIC BLOOD PRESSURE: 61 MMHG

## 2021-08-19 VITALS — DIASTOLIC BLOOD PRESSURE: 64 MMHG | SYSTOLIC BLOOD PRESSURE: 114 MMHG

## 2021-08-19 VITALS — DIASTOLIC BLOOD PRESSURE: 73 MMHG | SYSTOLIC BLOOD PRESSURE: 130 MMHG

## 2021-08-19 VITALS — SYSTOLIC BLOOD PRESSURE: 107 MMHG | DIASTOLIC BLOOD PRESSURE: 65 MMHG

## 2021-08-19 VITALS — SYSTOLIC BLOOD PRESSURE: 127 MMHG | DIASTOLIC BLOOD PRESSURE: 71 MMHG

## 2021-08-19 VITALS — DIASTOLIC BLOOD PRESSURE: 62 MMHG | SYSTOLIC BLOOD PRESSURE: 107 MMHG

## 2021-08-19 VITALS — DIASTOLIC BLOOD PRESSURE: 56 MMHG | SYSTOLIC BLOOD PRESSURE: 113 MMHG

## 2021-08-19 VITALS — DIASTOLIC BLOOD PRESSURE: 54 MMHG | SYSTOLIC BLOOD PRESSURE: 103 MMHG

## 2021-08-19 VITALS — DIASTOLIC BLOOD PRESSURE: 65 MMHG | SYSTOLIC BLOOD PRESSURE: 111 MMHG

## 2021-08-19 VITALS — SYSTOLIC BLOOD PRESSURE: 108 MMHG | DIASTOLIC BLOOD PRESSURE: 60 MMHG

## 2021-08-19 VITALS — SYSTOLIC BLOOD PRESSURE: 111 MMHG | DIASTOLIC BLOOD PRESSURE: 65 MMHG

## 2021-08-19 VITALS — DIASTOLIC BLOOD PRESSURE: 73 MMHG | SYSTOLIC BLOOD PRESSURE: 122 MMHG

## 2021-08-19 LAB
ANION GAP SERPL CALC-SCNC: 6 MMOL/L (ref 7–16)
BUN SERPL-MCNC: 39 MG/DL (ref 7–18)
CALCIUM SERPL-MCNC: 9 MG/DL (ref 8.5–10.1)
CHLORIDE SERPL-SCNC: 105 MMOL/L (ref 98–107)
CO2 SERPL-SCNC: 33 MMOL/L (ref 21–32)
CREAT SERPL-MCNC: 0.6 MG/DL (ref 0.6–1.3)
GLUCOSE SERPL-MCNC: 139 MG/DL (ref 70–99)
HCT VFR BLD CALC: 42.6 % (ref 37–47)
HGB BLD-MCNC: 14.3 GM/DL (ref 12–15)
MCH RBC QN AUTO: 32.7 PG (ref 26–34)
MCHC RBC AUTO-ENTMCNC: 33.6 G/DL (ref 28–37)
MCV RBC: 97.3 FL (ref 80–100)
MPV: 8.4 FL. (ref 7.2–11.1)
PLATELET COUNT*: 91 THOU/UL (ref 150–400)
POTASSIUM SERPL-SCNC: 4.7 MMOL/L (ref 3.5–5.1)
RBC # BLD AUTO: 4.38 MIL/UL (ref 4.2–5)
RDW-CV: 13.5 % (ref 10.5–14.5)
SODIUM SERPL-SCNC: 144 MMOL/L (ref 136–145)
WBC # BLD AUTO: 20.9 THOU/UL (ref 4–11)

## 2021-08-19 PROCEDURE — 5A0935A ASSISTANCE WITH RESPIRATORY VENTILATION, LESS THAN 24 CONSECUTIVE HOURS, HIGH NASAL FLOW/VELOCITY: ICD-10-PCS | Performed by: INTERNAL MEDICINE

## 2021-08-20 VITALS — DIASTOLIC BLOOD PRESSURE: 59 MMHG | SYSTOLIC BLOOD PRESSURE: 110 MMHG

## 2021-08-20 VITALS — DIASTOLIC BLOOD PRESSURE: 57 MMHG | SYSTOLIC BLOOD PRESSURE: 112 MMHG

## 2021-08-20 VITALS — SYSTOLIC BLOOD PRESSURE: 88 MMHG | DIASTOLIC BLOOD PRESSURE: 64 MMHG

## 2021-08-20 VITALS — DIASTOLIC BLOOD PRESSURE: 59 MMHG | SYSTOLIC BLOOD PRESSURE: 89 MMHG

## 2021-08-20 VITALS — SYSTOLIC BLOOD PRESSURE: 115 MMHG | DIASTOLIC BLOOD PRESSURE: 73 MMHG

## 2021-08-20 VITALS — SYSTOLIC BLOOD PRESSURE: 105 MMHG | DIASTOLIC BLOOD PRESSURE: 57 MMHG

## 2021-08-20 VITALS — DIASTOLIC BLOOD PRESSURE: 67 MMHG | SYSTOLIC BLOOD PRESSURE: 115 MMHG

## 2021-08-20 VITALS — SYSTOLIC BLOOD PRESSURE: 104 MMHG | DIASTOLIC BLOOD PRESSURE: 59 MMHG

## 2021-08-20 VITALS — SYSTOLIC BLOOD PRESSURE: 116 MMHG | DIASTOLIC BLOOD PRESSURE: 65 MMHG

## 2021-08-20 VITALS — SYSTOLIC BLOOD PRESSURE: 112 MMHG | DIASTOLIC BLOOD PRESSURE: 63 MMHG

## 2021-08-20 VITALS — DIASTOLIC BLOOD PRESSURE: 70 MMHG | SYSTOLIC BLOOD PRESSURE: 119 MMHG

## 2021-08-20 VITALS — SYSTOLIC BLOOD PRESSURE: 111 MMHG | DIASTOLIC BLOOD PRESSURE: 68 MMHG

## 2021-08-20 VITALS — SYSTOLIC BLOOD PRESSURE: 114 MMHG | DIASTOLIC BLOOD PRESSURE: 64 MMHG

## 2021-08-20 VITALS — DIASTOLIC BLOOD PRESSURE: 67 MMHG | SYSTOLIC BLOOD PRESSURE: 111 MMHG

## 2021-08-20 VITALS — DIASTOLIC BLOOD PRESSURE: 73 MMHG | SYSTOLIC BLOOD PRESSURE: 118 MMHG

## 2021-08-20 VITALS — DIASTOLIC BLOOD PRESSURE: 63 MMHG | SYSTOLIC BLOOD PRESSURE: 109 MMHG

## 2021-08-20 VITALS — SYSTOLIC BLOOD PRESSURE: 113 MMHG | DIASTOLIC BLOOD PRESSURE: 62 MMHG

## 2021-08-20 VITALS — SYSTOLIC BLOOD PRESSURE: 112 MMHG | DIASTOLIC BLOOD PRESSURE: 58 MMHG

## 2021-08-20 VITALS — DIASTOLIC BLOOD PRESSURE: 67 MMHG | SYSTOLIC BLOOD PRESSURE: 121 MMHG

## 2021-08-20 VITALS — SYSTOLIC BLOOD PRESSURE: 116 MMHG | DIASTOLIC BLOOD PRESSURE: 67 MMHG

## 2021-08-20 VITALS — DIASTOLIC BLOOD PRESSURE: 59 MMHG | SYSTOLIC BLOOD PRESSURE: 111 MMHG

## 2021-08-20 VITALS — SYSTOLIC BLOOD PRESSURE: 116 MMHG | DIASTOLIC BLOOD PRESSURE: 72 MMHG

## 2021-08-20 VITALS — DIASTOLIC BLOOD PRESSURE: 67 MMHG | SYSTOLIC BLOOD PRESSURE: 100 MMHG

## 2021-08-20 LAB
ABSOLUTE BASOPHILS: 0 THOU/UL (ref 0–0.2)
ABSOLUTE EOSINOPHILS: 0 THOU/UL (ref 0–0.7)
ABSOLUTE MONOCYTES: 0.3 THOU/UL (ref 0–1.2)
ALBUMIN SERPL-MCNC: 3.4 G/DL (ref 3.4–5)
ALP SERPL-CCNC: 155 U/L (ref 46–116)
ALT SERPL-CCNC: 59 U/L (ref 30–65)
ANION GAP SERPL CALC-SCNC: 8 MMOL/L (ref 7–16)
AST SERPL-CCNC: 29 U/L (ref 15–37)
BASOPHILS NFR BLD AUTO: 0.1 %
BILIRUB SERPL-MCNC: 0.9 MG/DL
BUN SERPL-MCNC: 39 MG/DL (ref 7–18)
CALCIUM SERPL-MCNC: 8.8 MG/DL (ref 8.5–10.1)
CHLORIDE SERPL-SCNC: 105 MMOL/L (ref 98–107)
CO2 SERPL-SCNC: 31 MMOL/L (ref 21–32)
CREAT SERPL-MCNC: 0.6 MG/DL (ref 0.6–1.3)
EOSINOPHIL NFR BLD: 0.2 %
GLUCOSE SERPL-MCNC: 143 MG/DL (ref 70–99)
GRANULOCYTES NFR BLD MANUAL: 94.1 %
HCT VFR BLD CALC: 42.2 % (ref 37–47)
HGB BLD-MCNC: 14.1 GM/DL (ref 12–15)
LYMPHOCYTES # BLD: 0.6 THOU/UL (ref 0.8–5.3)
LYMPHOCYTES NFR BLD AUTO: 3.6 %
MAGNESIUM SERPL-MCNC: 2.6 MG/DL (ref 1.8–2.4)
MCH RBC QN AUTO: 32.4 PG (ref 26–34)
MCHC RBC AUTO-ENTMCNC: 33.3 G/DL (ref 28–37)
MCV RBC: 97.3 FL (ref 80–100)
MONOCYTES NFR BLD: 2 %
MPV: 9 FL. (ref 7.2–11.1)
NEUTROPHILS # BLD: 16.5 THOU/UL (ref 1.6–8.1)
NUCLEATED RBCS: 0 /100WBC
PLATELET COUNT*: 89 THOU/UL (ref 150–400)
POTASSIUM SERPL-SCNC: 4.8 MMOL/L (ref 3.5–5.1)
PROT SERPL-MCNC: 6.1 G/DL (ref 6.4–8.2)
RBC # BLD AUTO: 4.34 MIL/UL (ref 4.2–5)
RDW-CV: 13.8 % (ref 10.5–14.5)
SODIUM SERPL-SCNC: 144 MMOL/L (ref 136–145)
WBC # BLD AUTO: 17.5 THOU/UL (ref 4–11)

## 2021-08-21 VITALS — DIASTOLIC BLOOD PRESSURE: 57 MMHG | SYSTOLIC BLOOD PRESSURE: 111 MMHG

## 2021-08-21 VITALS — SYSTOLIC BLOOD PRESSURE: 134 MMHG | DIASTOLIC BLOOD PRESSURE: 68 MMHG

## 2021-08-21 VITALS — SYSTOLIC BLOOD PRESSURE: 105 MMHG | DIASTOLIC BLOOD PRESSURE: 60 MMHG

## 2021-08-21 VITALS — DIASTOLIC BLOOD PRESSURE: 56 MMHG | SYSTOLIC BLOOD PRESSURE: 116 MMHG

## 2021-08-21 VITALS — DIASTOLIC BLOOD PRESSURE: 68 MMHG | SYSTOLIC BLOOD PRESSURE: 128 MMHG

## 2021-08-21 VITALS — DIASTOLIC BLOOD PRESSURE: 73 MMHG | SYSTOLIC BLOOD PRESSURE: 115 MMHG

## 2021-08-21 VITALS — DIASTOLIC BLOOD PRESSURE: 54 MMHG | SYSTOLIC BLOOD PRESSURE: 114 MMHG

## 2021-08-21 VITALS — SYSTOLIC BLOOD PRESSURE: 119 MMHG | DIASTOLIC BLOOD PRESSURE: 58 MMHG

## 2021-08-21 VITALS — SYSTOLIC BLOOD PRESSURE: 121 MMHG | DIASTOLIC BLOOD PRESSURE: 58 MMHG

## 2021-08-21 VITALS — SYSTOLIC BLOOD PRESSURE: 112 MMHG | DIASTOLIC BLOOD PRESSURE: 55 MMHG

## 2021-08-21 LAB
ANION GAP SERPL CALC-SCNC: 9 MMOL/L (ref 7–16)
BUN SERPL-MCNC: 36 MG/DL (ref 7–18)
CALCIUM SERPL-MCNC: 8.7 MG/DL (ref 8.5–10.1)
CHLORIDE SERPL-SCNC: 101 MMOL/L (ref 98–107)
CO2 SERPL-SCNC: 28 MMOL/L (ref 21–32)
CREAT SERPL-MCNC: 0.7 MG/DL (ref 0.6–1.3)
GLUCOSE SERPL-MCNC: 239 MG/DL (ref 70–99)
HCT VFR BLD CALC: 41.5 % (ref 37–47)
HGB BLD-MCNC: 13.8 GM/DL (ref 12–15)
MCH RBC QN AUTO: 32.3 PG (ref 26–34)
MCHC RBC AUTO-ENTMCNC: 33.2 G/DL (ref 28–37)
MCV RBC: 97.5 FL (ref 80–100)
MPV: 9.2 FL. (ref 7.2–11.1)
PLATELET COUNT*: 99 THOU/UL (ref 150–400)
POTASSIUM SERPL-SCNC: 3.8 MMOL/L (ref 3.5–5.1)
RBC # BLD AUTO: 4.26 MIL/UL (ref 4.2–5)
RDW-CV: 13.7 % (ref 10.5–14.5)
SODIUM SERPL-SCNC: 138 MMOL/L (ref 136–145)
WBC # BLD AUTO: 19.5 THOU/UL (ref 4–11)

## 2021-08-21 PROCEDURE — 5A0945A ASSISTANCE WITH RESPIRATORY VENTILATION, 24-96 CONSECUTIVE HOURS, HIGH NASAL FLOW/VELOCITY: ICD-10-PCS | Performed by: INTERNAL MEDICINE

## 2021-08-22 VITALS — SYSTOLIC BLOOD PRESSURE: 117 MMHG | DIASTOLIC BLOOD PRESSURE: 60 MMHG

## 2021-08-22 VITALS — SYSTOLIC BLOOD PRESSURE: 137 MMHG | DIASTOLIC BLOOD PRESSURE: 65 MMHG

## 2021-08-22 VITALS — SYSTOLIC BLOOD PRESSURE: 132 MMHG | DIASTOLIC BLOOD PRESSURE: 73 MMHG

## 2021-08-22 VITALS — SYSTOLIC BLOOD PRESSURE: 119 MMHG | DIASTOLIC BLOOD PRESSURE: 56 MMHG

## 2021-08-22 VITALS — SYSTOLIC BLOOD PRESSURE: 128 MMHG | DIASTOLIC BLOOD PRESSURE: 67 MMHG

## 2021-08-22 VITALS — DIASTOLIC BLOOD PRESSURE: 59 MMHG | SYSTOLIC BLOOD PRESSURE: 121 MMHG

## 2021-08-22 VITALS — DIASTOLIC BLOOD PRESSURE: 59 MMHG | SYSTOLIC BLOOD PRESSURE: 117 MMHG

## 2021-08-22 VITALS — DIASTOLIC BLOOD PRESSURE: 61 MMHG | SYSTOLIC BLOOD PRESSURE: 124 MMHG

## 2021-08-22 VITALS — DIASTOLIC BLOOD PRESSURE: 61 MMHG | SYSTOLIC BLOOD PRESSURE: 122 MMHG

## 2021-08-22 VITALS — SYSTOLIC BLOOD PRESSURE: 131 MMHG | DIASTOLIC BLOOD PRESSURE: 75 MMHG

## 2021-08-22 VITALS — SYSTOLIC BLOOD PRESSURE: 124 MMHG | DIASTOLIC BLOOD PRESSURE: 61 MMHG

## 2021-08-22 VITALS — SYSTOLIC BLOOD PRESSURE: 116 MMHG | DIASTOLIC BLOOD PRESSURE: 57 MMHG

## 2021-08-22 VITALS — DIASTOLIC BLOOD PRESSURE: 70 MMHG | SYSTOLIC BLOOD PRESSURE: 135 MMHG

## 2021-08-22 VITALS — SYSTOLIC BLOOD PRESSURE: 118 MMHG | DIASTOLIC BLOOD PRESSURE: 54 MMHG

## 2021-08-22 VITALS — SYSTOLIC BLOOD PRESSURE: 133 MMHG | DIASTOLIC BLOOD PRESSURE: 69 MMHG

## 2021-08-22 VITALS — DIASTOLIC BLOOD PRESSURE: 61 MMHG | SYSTOLIC BLOOD PRESSURE: 121 MMHG

## 2021-08-22 VITALS — DIASTOLIC BLOOD PRESSURE: 68 MMHG | SYSTOLIC BLOOD PRESSURE: 129 MMHG

## 2021-08-22 VITALS — SYSTOLIC BLOOD PRESSURE: 113 MMHG | DIASTOLIC BLOOD PRESSURE: 58 MMHG

## 2021-08-22 VITALS — SYSTOLIC BLOOD PRESSURE: 141 MMHG | DIASTOLIC BLOOD PRESSURE: 66 MMHG

## 2021-08-22 VITALS — SYSTOLIC BLOOD PRESSURE: 114 MMHG | DIASTOLIC BLOOD PRESSURE: 51 MMHG

## 2021-08-22 VITALS — DIASTOLIC BLOOD PRESSURE: 66 MMHG | SYSTOLIC BLOOD PRESSURE: 125 MMHG

## 2021-08-22 VITALS — DIASTOLIC BLOOD PRESSURE: 61 MMHG | SYSTOLIC BLOOD PRESSURE: 115 MMHG

## 2021-08-22 VITALS — DIASTOLIC BLOOD PRESSURE: 55 MMHG | SYSTOLIC BLOOD PRESSURE: 116 MMHG

## 2021-08-22 LAB
ANION GAP SERPL CALC-SCNC: 6 MMOL/L (ref 7–16)
BUN SERPL-MCNC: 26 MG/DL (ref 7–18)
CALCIUM SERPL-MCNC: 8.4 MG/DL (ref 8.5–10.1)
CHLORIDE SERPL-SCNC: 102 MMOL/L (ref 98–107)
CO2 SERPL-SCNC: 30 MMOL/L (ref 21–32)
CREAT SERPL-MCNC: 0.5 MG/DL (ref 0.6–1.3)
GLUCOSE SERPL-MCNC: 155 MG/DL (ref 70–99)
HCT VFR BLD CALC: 39.7 % (ref 37–47)
HGB BLD-MCNC: 13.3 GM/DL (ref 12–15)
MCH RBC QN AUTO: 32.6 PG (ref 26–34)
MCHC RBC AUTO-ENTMCNC: 33.5 G/DL (ref 28–37)
MCV RBC: 97.3 FL (ref 80–100)
MPV: 8.8 FL. (ref 7.2–11.1)
PLATELET COUNT*: 95 THOU/UL (ref 150–400)
POTASSIUM SERPL-SCNC: 4.2 MMOL/L (ref 3.5–5.1)
RBC # BLD AUTO: 4.08 MIL/UL (ref 4.2–5)
RDW-CV: 13.8 % (ref 10.5–14.5)
SODIUM SERPL-SCNC: 138 MMOL/L (ref 136–145)
WBC # BLD AUTO: 18.7 THOU/UL (ref 4–11)

## 2021-08-23 VITALS — DIASTOLIC BLOOD PRESSURE: 65 MMHG | SYSTOLIC BLOOD PRESSURE: 119 MMHG

## 2021-08-23 VITALS — SYSTOLIC BLOOD PRESSURE: 119 MMHG | DIASTOLIC BLOOD PRESSURE: 54 MMHG

## 2021-08-23 VITALS — DIASTOLIC BLOOD PRESSURE: 47 MMHG | SYSTOLIC BLOOD PRESSURE: 92 MMHG

## 2021-08-23 VITALS — DIASTOLIC BLOOD PRESSURE: 39 MMHG | SYSTOLIC BLOOD PRESSURE: 79 MMHG

## 2021-08-23 VITALS — SYSTOLIC BLOOD PRESSURE: 117 MMHG | DIASTOLIC BLOOD PRESSURE: 65 MMHG

## 2021-08-23 VITALS — DIASTOLIC BLOOD PRESSURE: 53 MMHG | SYSTOLIC BLOOD PRESSURE: 111 MMHG

## 2021-08-23 VITALS — DIASTOLIC BLOOD PRESSURE: 65 MMHG | SYSTOLIC BLOOD PRESSURE: 125 MMHG

## 2021-08-23 VITALS — SYSTOLIC BLOOD PRESSURE: 151 MMHG | DIASTOLIC BLOOD PRESSURE: 74 MMHG

## 2021-08-23 VITALS — DIASTOLIC BLOOD PRESSURE: 66 MMHG | SYSTOLIC BLOOD PRESSURE: 122 MMHG

## 2021-08-23 VITALS — DIASTOLIC BLOOD PRESSURE: 64 MMHG | SYSTOLIC BLOOD PRESSURE: 132 MMHG

## 2021-08-23 VITALS — SYSTOLIC BLOOD PRESSURE: 147 MMHG | DIASTOLIC BLOOD PRESSURE: 77 MMHG

## 2021-08-23 VITALS — SYSTOLIC BLOOD PRESSURE: 103 MMHG | DIASTOLIC BLOOD PRESSURE: 52 MMHG

## 2021-08-23 VITALS — DIASTOLIC BLOOD PRESSURE: 48 MMHG | SYSTOLIC BLOOD PRESSURE: 102 MMHG

## 2021-08-23 VITALS — DIASTOLIC BLOOD PRESSURE: 54 MMHG | SYSTOLIC BLOOD PRESSURE: 112 MMHG

## 2021-08-23 VITALS — DIASTOLIC BLOOD PRESSURE: 58 MMHG | SYSTOLIC BLOOD PRESSURE: 96 MMHG

## 2021-08-23 VITALS — DIASTOLIC BLOOD PRESSURE: 59 MMHG | SYSTOLIC BLOOD PRESSURE: 126 MMHG

## 2021-08-23 VITALS — DIASTOLIC BLOOD PRESSURE: 44 MMHG | SYSTOLIC BLOOD PRESSURE: 79 MMHG

## 2021-08-23 VITALS — SYSTOLIC BLOOD PRESSURE: 160 MMHG | DIASTOLIC BLOOD PRESSURE: 78 MMHG

## 2021-08-23 VITALS — DIASTOLIC BLOOD PRESSURE: 56 MMHG | SYSTOLIC BLOOD PRESSURE: 107 MMHG

## 2021-08-23 VITALS — DIASTOLIC BLOOD PRESSURE: 84 MMHG | SYSTOLIC BLOOD PRESSURE: 172 MMHG

## 2021-08-23 VITALS — DIASTOLIC BLOOD PRESSURE: 65 MMHG | SYSTOLIC BLOOD PRESSURE: 117 MMHG

## 2021-08-23 VITALS — DIASTOLIC BLOOD PRESSURE: 49 MMHG | SYSTOLIC BLOOD PRESSURE: 83 MMHG

## 2021-08-23 VITALS — SYSTOLIC BLOOD PRESSURE: 115 MMHG | DIASTOLIC BLOOD PRESSURE: 63 MMHG

## 2021-08-23 VITALS — DIASTOLIC BLOOD PRESSURE: 45 MMHG | SYSTOLIC BLOOD PRESSURE: 108 MMHG

## 2021-08-23 VITALS — DIASTOLIC BLOOD PRESSURE: 51 MMHG | SYSTOLIC BLOOD PRESSURE: 97 MMHG

## 2021-08-23 VITALS — SYSTOLIC BLOOD PRESSURE: 114 MMHG | DIASTOLIC BLOOD PRESSURE: 63 MMHG

## 2021-08-23 VITALS — SYSTOLIC BLOOD PRESSURE: 127 MMHG | DIASTOLIC BLOOD PRESSURE: 68 MMHG

## 2021-08-23 VITALS — SYSTOLIC BLOOD PRESSURE: 120 MMHG | DIASTOLIC BLOOD PRESSURE: 64 MMHG

## 2021-08-23 LAB
ABSOLUTE MONOCYTES: 0.4 THOU/UL (ref 0–1.2)
ALBUMIN SERPL-MCNC: 2.9 G/DL (ref 3.4–5)
ALP SERPL-CCNC: 151 U/L (ref 46–116)
ALT SERPL-CCNC: 78 U/L (ref 30–65)
ANION GAP SERPL CALC-SCNC: 8 MMOL/L (ref 7–16)
AST SERPL-CCNC: 42 U/L (ref 15–37)
BE: -3.6 MMOL/L
BILIRUB SERPL-MCNC: 1.1 MG/DL
BUN SERPL-MCNC: 22 MG/DL (ref 7–18)
CALCIUM SERPL-MCNC: 8.5 MG/DL (ref 8.5–10.1)
CHLORIDE SERPL-SCNC: 101 MMOL/L (ref 98–107)
CO2 SERPL-SCNC: 29 MMOL/L (ref 21–32)
CREAT SERPL-MCNC: 0.5 MG/DL (ref 0.6–1.3)
GAS PNL BLDV: 63.4 MMHG (ref 35–45)
GLUCOSE SERPL-MCNC: 158 MG/DL (ref 70–99)
GRANULOCYTES NFR BLD MANUAL: 95 %
HCT VFR BLD CALC: 40.7 % (ref 37–47)
HGB BLD-MCNC: 13.6 GM/DL (ref 12–15)
LYMPHOCYTES # BLD: 0.6 THOU/UL (ref 0.8–5.3)
LYMPHOCYTES NFR BLD AUTO: 3 %
Lab: 34.7 MG/24 HR (ref 12–293)
Lab: 9.9 MG/DL
MAGNESIUM SERPL-MCNC: 2 MG/DL (ref 1.8–2.4)
MCH RBC QN AUTO: 32.6 PG (ref 26–34)
MCHC RBC AUTO-ENTMCNC: 33.5 G/DL (ref 28–37)
MCV RBC: 97.2 FL (ref 80–100)
MONOCYTES NFR BLD: 2 %
MPV: 8.9 FL. (ref 7.2–11.1)
NEUTROPHILS # BLD: 19.9 THOU/UL (ref 1.6–8.1)
NUCLEATED RBCS: 0 /100WBC
PCO2 BLDV: 48.5 MMHG (ref 41–51)
PHOSPHATE SERPL-MCNC: 2.7 MG/DL (ref 2.5–4.9)
PLATELET # BLD EST: (no result) 10*3/UL
PLATELET COUNT*: 101 THOU/UL (ref 150–400)
POTASSIUM SERPL-SCNC: 3.5 MMOL/L (ref 3.5–5.1)
PROT SERPL-MCNC: 5.6 G/DL (ref 6.4–8.2)
RBC # BLD AUTO: 4.18 MIL/UL (ref 4.2–5)
RDW-CV: 14.1 % (ref 10.5–14.5)
SODIUM SERPL-SCNC: 138 MMOL/L (ref 136–145)
TOXIC GRANULES BLD QL SMEAR: (no result)
WBC # BLD AUTO: 20.9 THOU/UL (ref 4–11)

## 2021-08-23 PROCEDURE — 5A09357 ASSISTANCE WITH RESPIRATORY VENTILATION, LESS THAN 24 CONSECUTIVE HOURS, CONTINUOUS POSITIVE AIRWAY PRESSURE: ICD-10-PCS | Performed by: INTERNAL MEDICINE

## 2021-08-24 VITALS — SYSTOLIC BLOOD PRESSURE: 99 MMHG | DIASTOLIC BLOOD PRESSURE: 43 MMHG

## 2021-08-24 VITALS — DIASTOLIC BLOOD PRESSURE: 51 MMHG | SYSTOLIC BLOOD PRESSURE: 101 MMHG

## 2021-08-24 VITALS — DIASTOLIC BLOOD PRESSURE: 55 MMHG | SYSTOLIC BLOOD PRESSURE: 129 MMHG

## 2021-08-24 VITALS — SYSTOLIC BLOOD PRESSURE: 113 MMHG | DIASTOLIC BLOOD PRESSURE: 54 MMHG

## 2021-08-24 VITALS — SYSTOLIC BLOOD PRESSURE: 117 MMHG | DIASTOLIC BLOOD PRESSURE: 55 MMHG

## 2021-08-24 VITALS — DIASTOLIC BLOOD PRESSURE: 69 MMHG | SYSTOLIC BLOOD PRESSURE: 129 MMHG

## 2021-08-24 VITALS — DIASTOLIC BLOOD PRESSURE: 53 MMHG | SYSTOLIC BLOOD PRESSURE: 99 MMHG

## 2021-08-24 VITALS — DIASTOLIC BLOOD PRESSURE: 55 MMHG | SYSTOLIC BLOOD PRESSURE: 107 MMHG

## 2021-08-24 VITALS — DIASTOLIC BLOOD PRESSURE: 67 MMHG | SYSTOLIC BLOOD PRESSURE: 131 MMHG

## 2021-08-24 VITALS — DIASTOLIC BLOOD PRESSURE: 51 MMHG | SYSTOLIC BLOOD PRESSURE: 100 MMHG

## 2021-08-24 VITALS — DIASTOLIC BLOOD PRESSURE: 56 MMHG | SYSTOLIC BLOOD PRESSURE: 97 MMHG

## 2021-08-24 VITALS — DIASTOLIC BLOOD PRESSURE: 53 MMHG | SYSTOLIC BLOOD PRESSURE: 109 MMHG

## 2021-08-24 VITALS — SYSTOLIC BLOOD PRESSURE: 54 MMHG | DIASTOLIC BLOOD PRESSURE: 34 MMHG

## 2021-08-24 VITALS — SYSTOLIC BLOOD PRESSURE: 69 MMHG | DIASTOLIC BLOOD PRESSURE: 39 MMHG

## 2021-08-24 VITALS — DIASTOLIC BLOOD PRESSURE: 68 MMHG | SYSTOLIC BLOOD PRESSURE: 148 MMHG

## 2021-08-24 VITALS — SYSTOLIC BLOOD PRESSURE: 134 MMHG | DIASTOLIC BLOOD PRESSURE: 50 MMHG

## 2021-08-24 VITALS — DIASTOLIC BLOOD PRESSURE: 51 MMHG | SYSTOLIC BLOOD PRESSURE: 108 MMHG

## 2021-08-24 VITALS — DIASTOLIC BLOOD PRESSURE: 48 MMHG | SYSTOLIC BLOOD PRESSURE: 87 MMHG

## 2021-08-24 VITALS — SYSTOLIC BLOOD PRESSURE: 86 MMHG | DIASTOLIC BLOOD PRESSURE: 49 MMHG

## 2021-08-24 VITALS — SYSTOLIC BLOOD PRESSURE: 140 MMHG | DIASTOLIC BLOOD PRESSURE: 70 MMHG

## 2021-08-24 VITALS — SYSTOLIC BLOOD PRESSURE: 117 MMHG | DIASTOLIC BLOOD PRESSURE: 57 MMHG

## 2021-08-24 VITALS — DIASTOLIC BLOOD PRESSURE: 58 MMHG | SYSTOLIC BLOOD PRESSURE: 100 MMHG

## 2021-08-24 VITALS — DIASTOLIC BLOOD PRESSURE: 67 MMHG | SYSTOLIC BLOOD PRESSURE: 137 MMHG

## 2021-08-24 VITALS — SYSTOLIC BLOOD PRESSURE: 138 MMHG | DIASTOLIC BLOOD PRESSURE: 73 MMHG

## 2021-08-24 VITALS — SYSTOLIC BLOOD PRESSURE: 120 MMHG | DIASTOLIC BLOOD PRESSURE: 61 MMHG

## 2021-08-24 VITALS — SYSTOLIC BLOOD PRESSURE: 71 MMHG | DIASTOLIC BLOOD PRESSURE: 34 MMHG

## 2021-08-24 VITALS — DIASTOLIC BLOOD PRESSURE: 62 MMHG | SYSTOLIC BLOOD PRESSURE: 111 MMHG

## 2021-08-24 VITALS — SYSTOLIC BLOOD PRESSURE: 135 MMHG | DIASTOLIC BLOOD PRESSURE: 70 MMHG

## 2021-08-24 VITALS — SYSTOLIC BLOOD PRESSURE: 127 MMHG | DIASTOLIC BLOOD PRESSURE: 57 MMHG

## 2021-08-24 VITALS — DIASTOLIC BLOOD PRESSURE: 53 MMHG | SYSTOLIC BLOOD PRESSURE: 126 MMHG

## 2021-08-24 VITALS — SYSTOLIC BLOOD PRESSURE: 66 MMHG | DIASTOLIC BLOOD PRESSURE: 30 MMHG

## 2021-08-24 VITALS — DIASTOLIC BLOOD PRESSURE: 66 MMHG | SYSTOLIC BLOOD PRESSURE: 145 MMHG

## 2021-08-24 VITALS — DIASTOLIC BLOOD PRESSURE: 52 MMHG | SYSTOLIC BLOOD PRESSURE: 95 MMHG

## 2021-08-24 VITALS — DIASTOLIC BLOOD PRESSURE: 73 MMHG | SYSTOLIC BLOOD PRESSURE: 163 MMHG

## 2021-08-24 VITALS — SYSTOLIC BLOOD PRESSURE: 151 MMHG | DIASTOLIC BLOOD PRESSURE: 66 MMHG

## 2021-08-24 VITALS — DIASTOLIC BLOOD PRESSURE: 63 MMHG | SYSTOLIC BLOOD PRESSURE: 125 MMHG

## 2021-08-24 VITALS — DIASTOLIC BLOOD PRESSURE: 37 MMHG | SYSTOLIC BLOOD PRESSURE: 62 MMHG

## 2021-08-24 VITALS — DIASTOLIC BLOOD PRESSURE: 47 MMHG | SYSTOLIC BLOOD PRESSURE: 122 MMHG

## 2021-08-24 VITALS — SYSTOLIC BLOOD PRESSURE: 110 MMHG | DIASTOLIC BLOOD PRESSURE: 53 MMHG

## 2021-08-24 VITALS — SYSTOLIC BLOOD PRESSURE: 138 MMHG | DIASTOLIC BLOOD PRESSURE: 65 MMHG

## 2021-08-24 VITALS — DIASTOLIC BLOOD PRESSURE: 52 MMHG | SYSTOLIC BLOOD PRESSURE: 91 MMHG

## 2021-08-24 VITALS — SYSTOLIC BLOOD PRESSURE: 100 MMHG | DIASTOLIC BLOOD PRESSURE: 51 MMHG

## 2021-08-24 VITALS — SYSTOLIC BLOOD PRESSURE: 129 MMHG | DIASTOLIC BLOOD PRESSURE: 65 MMHG

## 2021-08-24 VITALS — DIASTOLIC BLOOD PRESSURE: 47 MMHG | SYSTOLIC BLOOD PRESSURE: 103 MMHG

## 2021-08-24 VITALS — SYSTOLIC BLOOD PRESSURE: 159 MMHG | DIASTOLIC BLOOD PRESSURE: 61 MMHG

## 2021-08-24 VITALS — SYSTOLIC BLOOD PRESSURE: 61 MMHG | DIASTOLIC BLOOD PRESSURE: 36 MMHG

## 2021-08-24 VITALS — DIASTOLIC BLOOD PRESSURE: 53 MMHG | SYSTOLIC BLOOD PRESSURE: 101 MMHG

## 2021-08-24 VITALS — DIASTOLIC BLOOD PRESSURE: 55 MMHG | SYSTOLIC BLOOD PRESSURE: 132 MMHG

## 2021-08-24 VITALS — DIASTOLIC BLOOD PRESSURE: 44 MMHG | SYSTOLIC BLOOD PRESSURE: 94 MMHG

## 2021-08-24 VITALS — DIASTOLIC BLOOD PRESSURE: 48 MMHG | SYSTOLIC BLOOD PRESSURE: 104 MMHG

## 2021-08-24 VITALS — DIASTOLIC BLOOD PRESSURE: 63 MMHG | SYSTOLIC BLOOD PRESSURE: 134 MMHG

## 2021-08-24 VITALS — DIASTOLIC BLOOD PRESSURE: 60 MMHG | SYSTOLIC BLOOD PRESSURE: 122 MMHG

## 2021-08-24 VITALS — SYSTOLIC BLOOD PRESSURE: 155 MMHG | DIASTOLIC BLOOD PRESSURE: 68 MMHG

## 2021-08-24 VITALS — SYSTOLIC BLOOD PRESSURE: 134 MMHG | DIASTOLIC BLOOD PRESSURE: 69 MMHG

## 2021-08-24 VITALS — SYSTOLIC BLOOD PRESSURE: 104 MMHG | DIASTOLIC BLOOD PRESSURE: 48 MMHG

## 2021-08-24 VITALS — SYSTOLIC BLOOD PRESSURE: 143 MMHG | DIASTOLIC BLOOD PRESSURE: 74 MMHG

## 2021-08-24 VITALS — DIASTOLIC BLOOD PRESSURE: 64 MMHG | SYSTOLIC BLOOD PRESSURE: 120 MMHG

## 2021-08-24 VITALS — DIASTOLIC BLOOD PRESSURE: 65 MMHG | SYSTOLIC BLOOD PRESSURE: 107 MMHG

## 2021-08-24 VITALS — SYSTOLIC BLOOD PRESSURE: 112 MMHG | DIASTOLIC BLOOD PRESSURE: 46 MMHG

## 2021-08-24 VITALS — SYSTOLIC BLOOD PRESSURE: 80 MMHG | DIASTOLIC BLOOD PRESSURE: 49 MMHG

## 2021-08-24 VITALS — DIASTOLIC BLOOD PRESSURE: 66 MMHG | SYSTOLIC BLOOD PRESSURE: 123 MMHG

## 2021-08-24 VITALS — SYSTOLIC BLOOD PRESSURE: 111 MMHG | DIASTOLIC BLOOD PRESSURE: 54 MMHG

## 2021-08-24 VITALS — SYSTOLIC BLOOD PRESSURE: 108 MMHG | DIASTOLIC BLOOD PRESSURE: 47 MMHG

## 2021-08-24 VITALS — DIASTOLIC BLOOD PRESSURE: 81 MMHG | SYSTOLIC BLOOD PRESSURE: 147 MMHG

## 2021-08-24 VITALS — DIASTOLIC BLOOD PRESSURE: 48 MMHG | SYSTOLIC BLOOD PRESSURE: 108 MMHG

## 2021-08-24 VITALS — SYSTOLIC BLOOD PRESSURE: 126 MMHG | DIASTOLIC BLOOD PRESSURE: 66 MMHG

## 2021-08-24 VITALS — DIASTOLIC BLOOD PRESSURE: 47 MMHG | SYSTOLIC BLOOD PRESSURE: 105 MMHG

## 2021-08-24 VITALS — DIASTOLIC BLOOD PRESSURE: 61 MMHG | SYSTOLIC BLOOD PRESSURE: 124 MMHG

## 2021-08-24 VITALS — SYSTOLIC BLOOD PRESSURE: 79 MMHG | DIASTOLIC BLOOD PRESSURE: 52 MMHG

## 2021-08-24 VITALS — SYSTOLIC BLOOD PRESSURE: 109 MMHG | DIASTOLIC BLOOD PRESSURE: 50 MMHG

## 2021-08-24 VITALS — SYSTOLIC BLOOD PRESSURE: 102 MMHG | DIASTOLIC BLOOD PRESSURE: 59 MMHG

## 2021-08-24 VITALS — DIASTOLIC BLOOD PRESSURE: 67 MMHG | SYSTOLIC BLOOD PRESSURE: 143 MMHG

## 2021-08-24 VITALS — DIASTOLIC BLOOD PRESSURE: 46 MMHG | SYSTOLIC BLOOD PRESSURE: 78 MMHG

## 2021-08-24 VITALS — DIASTOLIC BLOOD PRESSURE: 69 MMHG | SYSTOLIC BLOOD PRESSURE: 141 MMHG

## 2021-08-24 VITALS — SYSTOLIC BLOOD PRESSURE: 88 MMHG | DIASTOLIC BLOOD PRESSURE: 48 MMHG

## 2021-08-24 VITALS — DIASTOLIC BLOOD PRESSURE: 46 MMHG | SYSTOLIC BLOOD PRESSURE: 106 MMHG

## 2021-08-24 VITALS — DIASTOLIC BLOOD PRESSURE: 82 MMHG | SYSTOLIC BLOOD PRESSURE: 166 MMHG

## 2021-08-24 VITALS — DIASTOLIC BLOOD PRESSURE: 70 MMHG | SYSTOLIC BLOOD PRESSURE: 142 MMHG

## 2021-08-24 VITALS — DIASTOLIC BLOOD PRESSURE: 60 MMHG | SYSTOLIC BLOOD PRESSURE: 133 MMHG

## 2021-08-24 VITALS — DIASTOLIC BLOOD PRESSURE: 58 MMHG | SYSTOLIC BLOOD PRESSURE: 115 MMHG

## 2021-08-24 VITALS — DIASTOLIC BLOOD PRESSURE: 43 MMHG | SYSTOLIC BLOOD PRESSURE: 71 MMHG

## 2021-08-24 VITALS — DIASTOLIC BLOOD PRESSURE: 40 MMHG | SYSTOLIC BLOOD PRESSURE: 89 MMHG

## 2021-08-24 VITALS — DIASTOLIC BLOOD PRESSURE: 56 MMHG | SYSTOLIC BLOOD PRESSURE: 90 MMHG

## 2021-08-24 VITALS — SYSTOLIC BLOOD PRESSURE: 135 MMHG | DIASTOLIC BLOOD PRESSURE: 66 MMHG

## 2021-08-24 VITALS — DIASTOLIC BLOOD PRESSURE: 64 MMHG | SYSTOLIC BLOOD PRESSURE: 133 MMHG

## 2021-08-24 VITALS — SYSTOLIC BLOOD PRESSURE: 107 MMHG | DIASTOLIC BLOOD PRESSURE: 52 MMHG

## 2021-08-24 VITALS — DIASTOLIC BLOOD PRESSURE: 66 MMHG | SYSTOLIC BLOOD PRESSURE: 128 MMHG

## 2021-08-24 VITALS — DIASTOLIC BLOOD PRESSURE: 73 MMHG | SYSTOLIC BLOOD PRESSURE: 133 MMHG

## 2021-08-24 VITALS — DIASTOLIC BLOOD PRESSURE: 64 MMHG | SYSTOLIC BLOOD PRESSURE: 138 MMHG

## 2021-08-24 VITALS — DIASTOLIC BLOOD PRESSURE: 59 MMHG | SYSTOLIC BLOOD PRESSURE: 122 MMHG

## 2021-08-24 VITALS — SYSTOLIC BLOOD PRESSURE: 81 MMHG | DIASTOLIC BLOOD PRESSURE: 39 MMHG

## 2021-08-24 VITALS — SYSTOLIC BLOOD PRESSURE: 137 MMHG | DIASTOLIC BLOOD PRESSURE: 63 MMHG

## 2021-08-24 VITALS — SYSTOLIC BLOOD PRESSURE: 83 MMHG | DIASTOLIC BLOOD PRESSURE: 50 MMHG

## 2021-08-24 VITALS — DIASTOLIC BLOOD PRESSURE: 46 MMHG | SYSTOLIC BLOOD PRESSURE: 79 MMHG

## 2021-08-24 VITALS — DIASTOLIC BLOOD PRESSURE: 50 MMHG | SYSTOLIC BLOOD PRESSURE: 94 MMHG

## 2021-08-24 VITALS — SYSTOLIC BLOOD PRESSURE: 118 MMHG | DIASTOLIC BLOOD PRESSURE: 62 MMHG

## 2021-08-24 VITALS — SYSTOLIC BLOOD PRESSURE: 131 MMHG | DIASTOLIC BLOOD PRESSURE: 71 MMHG

## 2021-08-24 VITALS — DIASTOLIC BLOOD PRESSURE: 57 MMHG | SYSTOLIC BLOOD PRESSURE: 111 MMHG

## 2021-08-24 LAB
ANION GAP SERPL CALC-SCNC: 8 MMOL/L (ref 7–16)
BE: -2 MMOL/L
BE: -3.1 MMOL/L
BILIRUB UR-MCNC: NEGATIVE MG/DL
BUN SERPL-MCNC: 29 MG/DL (ref 7–18)
CALCIUM SERPL-MCNC: 9.5 MG/DL (ref 8.5–10.1)
CHLORIDE SERPL-SCNC: 102 MMOL/L (ref 98–107)
CO2 SERPL-SCNC: 28 MMOL/L (ref 21–32)
COLOR UR: YELLOW
CREAT SERPL-MCNC: 0.6 MG/DL (ref 0.6–1.3)
GLUCOSE SERPL-MCNC: 192 MG/DL (ref 70–99)
HCT VFR BLD CALC: 43.9 % (ref 37–47)
HGB BLD-MCNC: 14.4 GM/DL (ref 12–15)
KETONES UR STRIP-MCNC: NEGATIVE MG/DL
MCH RBC QN AUTO: 32.3 PG (ref 26–34)
MCHC RBC AUTO-ENTMCNC: 32.9 G/DL (ref 28–37)
MCV RBC: 98.1 FL (ref 80–100)
MPV: 8.8 FL. (ref 7.2–11.1)
PCO2 BLD: 52.1 MMHG (ref 35–45)
PCO2 BLD: 67.8 MMHG (ref 35–45)
PLATELET COUNT*: 121 THOU/UL (ref 150–400)
PO2 BLD: 49.2 MMHG (ref 75–100)
PO2 BLD: 62.5 MMHG (ref 75–100)
POTASSIUM SERPL-SCNC: 3.8 MMOL/L (ref 3.5–5.1)
PROT UR QL STRIP: (no result)
RBC # BLD AUTO: 4.47 MIL/UL (ref 4.2–5)
RBC # UR STRIP: NEGATIVE /UL
RDW-CV: 14.2 % (ref 10.5–14.5)
SODIUM SERPL-SCNC: 138 MMOL/L (ref 136–145)
SP GR UR STRIP: 1.02 (ref 1–1.03)
URINE CLARITY: CLEAR
URINE GLUCOSE-RANDOM: NEGATIVE
URINE LEUKOCYTES-REFLEX: NEGATIVE
URINE NITRITE-REFLEX: NEGATIVE
UROBILINOGEN UR STRIP-ACNC: 0.2 E.U./DL (ref 0.2–1)
WBC # BLD AUTO: 18.5 THOU/UL (ref 4–11)

## 2021-08-24 PROCEDURE — 5A1955Z RESPIRATORY VENTILATION, GREATER THAN 96 CONSECUTIVE HOURS: ICD-10-PCS

## 2021-08-24 PROCEDURE — 0BH17EZ INSERTION OF ENDOTRACHEAL AIRWAY INTO TRACHEA, VIA NATURAL OR ARTIFICIAL OPENING: ICD-10-PCS

## 2021-08-24 PROCEDURE — 0BH17EZ INSERTION OF ENDOTRACHEAL AIRWAY INTO TRACHEA, VIA NATURAL OR ARTIFICIAL OPENING: ICD-10-PCS | Performed by: INTERNAL MEDICINE

## 2021-08-24 PROCEDURE — 5A09357 ASSISTANCE WITH RESPIRATORY VENTILATION, LESS THAN 24 CONSECUTIVE HOURS, CONTINUOUS POSITIVE AIRWAY PRESSURE: ICD-10-PCS | Performed by: INTERNAL MEDICINE

## 2021-08-25 VITALS — DIASTOLIC BLOOD PRESSURE: 50 MMHG | SYSTOLIC BLOOD PRESSURE: 94 MMHG

## 2021-08-25 VITALS — SYSTOLIC BLOOD PRESSURE: 127 MMHG | DIASTOLIC BLOOD PRESSURE: 65 MMHG

## 2021-08-25 VITALS — DIASTOLIC BLOOD PRESSURE: 50 MMHG | SYSTOLIC BLOOD PRESSURE: 89 MMHG

## 2021-08-25 VITALS — DIASTOLIC BLOOD PRESSURE: 44 MMHG | SYSTOLIC BLOOD PRESSURE: 83 MMHG

## 2021-08-25 VITALS — DIASTOLIC BLOOD PRESSURE: 50 MMHG | SYSTOLIC BLOOD PRESSURE: 110 MMHG

## 2021-08-25 VITALS — DIASTOLIC BLOOD PRESSURE: 41 MMHG | SYSTOLIC BLOOD PRESSURE: 71 MMHG

## 2021-08-25 VITALS — SYSTOLIC BLOOD PRESSURE: 79 MMHG | DIASTOLIC BLOOD PRESSURE: 51 MMHG

## 2021-08-25 VITALS — SYSTOLIC BLOOD PRESSURE: 105 MMHG | DIASTOLIC BLOOD PRESSURE: 60 MMHG

## 2021-08-25 VITALS — DIASTOLIC BLOOD PRESSURE: 53 MMHG | SYSTOLIC BLOOD PRESSURE: 99 MMHG

## 2021-08-25 VITALS — DIASTOLIC BLOOD PRESSURE: 51 MMHG | SYSTOLIC BLOOD PRESSURE: 87 MMHG

## 2021-08-25 VITALS — DIASTOLIC BLOOD PRESSURE: 49 MMHG | SYSTOLIC BLOOD PRESSURE: 92 MMHG

## 2021-08-25 VITALS — DIASTOLIC BLOOD PRESSURE: 51 MMHG | SYSTOLIC BLOOD PRESSURE: 108 MMHG

## 2021-08-25 VITALS — DIASTOLIC BLOOD PRESSURE: 48 MMHG | SYSTOLIC BLOOD PRESSURE: 81 MMHG

## 2021-08-25 VITALS — DIASTOLIC BLOOD PRESSURE: 46 MMHG | SYSTOLIC BLOOD PRESSURE: 82 MMHG

## 2021-08-25 VITALS — DIASTOLIC BLOOD PRESSURE: 30 MMHG | SYSTOLIC BLOOD PRESSURE: 64 MMHG

## 2021-08-25 VITALS — SYSTOLIC BLOOD PRESSURE: 112 MMHG | DIASTOLIC BLOOD PRESSURE: 61 MMHG

## 2021-08-25 VITALS — SYSTOLIC BLOOD PRESSURE: 141 MMHG | DIASTOLIC BLOOD PRESSURE: 72 MMHG

## 2021-08-25 VITALS — SYSTOLIC BLOOD PRESSURE: 79 MMHG | DIASTOLIC BLOOD PRESSURE: 46 MMHG

## 2021-08-25 VITALS — DIASTOLIC BLOOD PRESSURE: 50 MMHG | SYSTOLIC BLOOD PRESSURE: 100 MMHG

## 2021-08-25 VITALS — SYSTOLIC BLOOD PRESSURE: 91 MMHG | DIASTOLIC BLOOD PRESSURE: 52 MMHG

## 2021-08-25 VITALS — DIASTOLIC BLOOD PRESSURE: 53 MMHG | SYSTOLIC BLOOD PRESSURE: 100 MMHG

## 2021-08-25 VITALS — DIASTOLIC BLOOD PRESSURE: 54 MMHG | SYSTOLIC BLOOD PRESSURE: 102 MMHG

## 2021-08-25 VITALS — DIASTOLIC BLOOD PRESSURE: 65 MMHG | SYSTOLIC BLOOD PRESSURE: 128 MMHG

## 2021-08-25 VITALS — DIASTOLIC BLOOD PRESSURE: 72 MMHG | SYSTOLIC BLOOD PRESSURE: 135 MMHG

## 2021-08-25 VITALS — SYSTOLIC BLOOD PRESSURE: 82 MMHG | DIASTOLIC BLOOD PRESSURE: 44 MMHG

## 2021-08-25 VITALS — DIASTOLIC BLOOD PRESSURE: 51 MMHG | SYSTOLIC BLOOD PRESSURE: 95 MMHG

## 2021-08-25 VITALS — DIASTOLIC BLOOD PRESSURE: 49 MMHG | SYSTOLIC BLOOD PRESSURE: 88 MMHG

## 2021-08-25 VITALS — SYSTOLIC BLOOD PRESSURE: 85 MMHG | DIASTOLIC BLOOD PRESSURE: 48 MMHG

## 2021-08-25 VITALS — SYSTOLIC BLOOD PRESSURE: 98 MMHG | DIASTOLIC BLOOD PRESSURE: 50 MMHG

## 2021-08-25 VITALS — DIASTOLIC BLOOD PRESSURE: 48 MMHG | SYSTOLIC BLOOD PRESSURE: 79 MMHG

## 2021-08-25 VITALS — DIASTOLIC BLOOD PRESSURE: 44 MMHG | SYSTOLIC BLOOD PRESSURE: 81 MMHG

## 2021-08-25 VITALS — DIASTOLIC BLOOD PRESSURE: 50 MMHG | SYSTOLIC BLOOD PRESSURE: 92 MMHG

## 2021-08-25 VITALS — SYSTOLIC BLOOD PRESSURE: 84 MMHG | DIASTOLIC BLOOD PRESSURE: 52 MMHG

## 2021-08-25 VITALS — DIASTOLIC BLOOD PRESSURE: 69 MMHG | SYSTOLIC BLOOD PRESSURE: 133 MMHG

## 2021-08-25 VITALS — SYSTOLIC BLOOD PRESSURE: 110 MMHG | DIASTOLIC BLOOD PRESSURE: 52 MMHG

## 2021-08-25 VITALS — SYSTOLIC BLOOD PRESSURE: 90 MMHG | DIASTOLIC BLOOD PRESSURE: 51 MMHG

## 2021-08-25 VITALS — SYSTOLIC BLOOD PRESSURE: 78 MMHG | DIASTOLIC BLOOD PRESSURE: 46 MMHG

## 2021-08-25 VITALS — DIASTOLIC BLOOD PRESSURE: 47 MMHG | SYSTOLIC BLOOD PRESSURE: 86 MMHG

## 2021-08-25 VITALS — SYSTOLIC BLOOD PRESSURE: 140 MMHG | DIASTOLIC BLOOD PRESSURE: 59 MMHG

## 2021-08-25 VITALS — DIASTOLIC BLOOD PRESSURE: 51 MMHG | SYSTOLIC BLOOD PRESSURE: 90 MMHG

## 2021-08-25 VITALS — SYSTOLIC BLOOD PRESSURE: 71 MMHG | DIASTOLIC BLOOD PRESSURE: 44 MMHG

## 2021-08-25 VITALS — SYSTOLIC BLOOD PRESSURE: 86 MMHG | DIASTOLIC BLOOD PRESSURE: 49 MMHG

## 2021-08-25 VITALS — SYSTOLIC BLOOD PRESSURE: 71 MMHG | DIASTOLIC BLOOD PRESSURE: 46 MMHG

## 2021-08-25 VITALS — SYSTOLIC BLOOD PRESSURE: 100 MMHG | DIASTOLIC BLOOD PRESSURE: 54 MMHG

## 2021-08-25 VITALS — DIASTOLIC BLOOD PRESSURE: 64 MMHG | SYSTOLIC BLOOD PRESSURE: 153 MMHG

## 2021-08-25 VITALS — DIASTOLIC BLOOD PRESSURE: 46 MMHG | SYSTOLIC BLOOD PRESSURE: 101 MMHG

## 2021-08-25 VITALS — SYSTOLIC BLOOD PRESSURE: 102 MMHG | DIASTOLIC BLOOD PRESSURE: 52 MMHG

## 2021-08-25 VITALS — SYSTOLIC BLOOD PRESSURE: 100 MMHG | DIASTOLIC BLOOD PRESSURE: 51 MMHG

## 2021-08-25 VITALS — DIASTOLIC BLOOD PRESSURE: 50 MMHG | SYSTOLIC BLOOD PRESSURE: 106 MMHG

## 2021-08-25 VITALS — SYSTOLIC BLOOD PRESSURE: 105 MMHG | DIASTOLIC BLOOD PRESSURE: 55 MMHG

## 2021-08-25 VITALS — SYSTOLIC BLOOD PRESSURE: 87 MMHG | DIASTOLIC BLOOD PRESSURE: 49 MMHG

## 2021-08-25 VITALS — DIASTOLIC BLOOD PRESSURE: 48 MMHG | SYSTOLIC BLOOD PRESSURE: 89 MMHG

## 2021-08-25 VITALS — SYSTOLIC BLOOD PRESSURE: 86 MMHG | DIASTOLIC BLOOD PRESSURE: 43 MMHG

## 2021-08-25 VITALS — DIASTOLIC BLOOD PRESSURE: 46 MMHG | SYSTOLIC BLOOD PRESSURE: 95 MMHG

## 2021-08-25 LAB
ABSOLUTE BASOPHILS: 0 THOU/UL (ref 0–0.2)
ABSOLUTE EOSINOPHILS: 0 THOU/UL (ref 0–0.7)
ABSOLUTE MONOCYTES: 0.5 THOU/UL (ref 0–1.2)
ALBUMIN SERPL-MCNC: 2.1 G/DL (ref 3.4–5)
ALP SERPL-CCNC: 145 U/L (ref 46–116)
ALT SERPL-CCNC: 123 U/L (ref 30–65)
ANION GAP SERPL CALC-SCNC: 7 MMOL/L (ref 7–16)
AST SERPL-CCNC: 35 U/L (ref 15–37)
BASOPHILS NFR BLD AUTO: 0.1 %
BE: -2.9 MMOL/L
BE: -4.2 MMOL/L
BILIRUB SERPL-MCNC: 1.3 MG/DL
BUN SERPL-MCNC: 53 MG/DL (ref 7–18)
CALCIUM SERPL-MCNC: 9.4 MG/DL (ref 8.5–10.1)
CHLORIDE SERPL-SCNC: 104 MMOL/L (ref 98–107)
CO2 SERPL-SCNC: 27 MMOL/L (ref 21–32)
CREAT SERPL-MCNC: 1 MG/DL (ref 0.6–1.3)
EOSINOPHIL NFR BLD: 0 %
GLUCOSE SERPL-MCNC: 160 MG/DL (ref 70–99)
GRANULOCYTES NFR BLD MANUAL: 93.3 %
HCT VFR BLD CALC: 39.3 % (ref 37–47)
HGB BLD-MCNC: 13.1 GM/DL (ref 12–15)
LYMPHOCYTES # BLD: 0.5 THOU/UL (ref 0.8–5.3)
LYMPHOCYTES NFR BLD AUTO: 3.2 %
MCH RBC QN AUTO: 33 PG (ref 26–34)
MCHC RBC AUTO-ENTMCNC: 33.3 G/DL (ref 28–37)
MCV RBC: 98.9 FL (ref 80–100)
MONOCYTES NFR BLD: 3.4 %
MPV: 9.6 FL. (ref 7.2–11.1)
NEUTROPHILS # BLD: 14.1 THOU/UL (ref 1.6–8.1)
NUCLEATED RBCS: 1 /100WBC
PCO2 BLD: 34 MMHG (ref 35–45)
PCO2 BLD: 52.9 MMHG (ref 35–45)
PLATELET COUNT*: 130 THOU/UL (ref 150–400)
PO2 BLD: 64 MMHG (ref 75–100)
PO2 BLD: 77.9 MMHG (ref 75–100)
POTASSIUM SERPL-SCNC: 4.2 MMOL/L (ref 3.5–5.1)
PROT SERPL-MCNC: 5.9 G/DL (ref 6.4–8.2)
RBC # BLD AUTO: 3.97 MIL/UL (ref 4.2–5)
RDW-CV: 14.6 % (ref 10.5–14.5)
SODIUM SERPL-SCNC: 138 MMOL/L (ref 136–145)
WBC # BLD AUTO: 15.2 THOU/UL (ref 4–11)

## 2021-08-25 NOTE — 2DMMODE
[place] [address]
Phone:  [phone] 2 D/M-MODE ECHOCARDIOGRAM     
_______________________________________________________________________________
 
Name:         SHAHABORALIA IRA               Room:          [address]    [place]M [place] 
R.#:    [id number]     Account #:     [id number]  
Admission:    07/30/21    Attend Phys:   James Leigh
Discharge:                Date of Birth: [birth year]  
Date of Service: 08/25/21 1745  Report #:      [id number]
        [id number]
_______________________________________________________________________________
THIS REPORT FOR:
 
cc:  Alex Brady Michael G DO Liston, Michael J. MD [facility]     
                                                                       ~
 
--------------- APPROVED REPORT --------------
 
 
Study performed:  08/25/2021 15:40:39
 
EXAM: Limited 2D Echocardiogram 
Patient Location: In-Patient 
Room #:  [facility]   Status:  routine
 
      BSA:         1.49
HR: 99 bpm BP:          105/55 mmHg 
Rhythm: NSR     
 
Other Information 
Study Quality: Good
 
Indications
Reassess right heart pressures
 
Tricuspid Valve
    RAP Estimate:  5.00 mmHg
TR Peak Gr.:  20.12 mmHg RVSP:  25.00 mmHg
    PA Pressure:  25.00 mmHg
 
Left Ventricle
The left ventricle is normal size. There is normal LV segmental wall 
motion. Mild concentric left ventricular hypertrophy. The left 
ventricular systolic function is normal. LVEF is 70%.
 
Right Ventricle
Right ventricle is mildly dilated. The right ventricular systolic 
function is normal.
 
Atria
The left atrium size is normal. The right atrium size is 
normal.
 
Aortic Valve
The aortic valve is normal in structure.
 
 
[place] [address]
Phone:  [phone]                     2 D/M-MODE ECHOCARDIOGRAM     
_______________________________________________________________________________
 
Name:         ORALIA GUDINO               Room:          [address] [place] 
M.R.#:    [id number]     Account #:     [id number]  
Admission:    07/30/21    Attend Phys:   James Leigh
Discharge:                Date of Birth: [birth year]  
Date of Service: 08/25/21 1745  Report #:      [id number]
        [id number]
_______________________________________________________________________________
 
Mitral Valve
The mitral valve is normal in structure.
 
Tricuspid Valve
The tricuspid valve is normal in structure. Mild tricuspid 
regurgitation. No pulmonary hypertension.
 
Great Vessels
IVC is not well visualized.
 
<Conclusion>
The left ventricle is normal size.
Mild concentric left ventricular hypertrophy.
The left ventricular systolic function is normal.
LVEF is 70%.
There is normal LV segmental wall motion.
Right ventricle is mildly dilated.
The right ventricular systolic function is normal.
Mild tricuspid regurgitation.
No pulmonary hypertension.
 
 
 
 
 
 
 
 
 
 
 
 
 
 
 
 
 
 
 
 
 
 
 
  <ELECTRONICALLY SIGNED>
                                           By: Alex Pena MD, FACC   
  08/25/21 1745
D: 08/25/21 1745   _____________________________________
T: 08/25/21 1745   Alex Pena MD, FACC     /INF

## 2021-08-26 VITALS — SYSTOLIC BLOOD PRESSURE: 118 MMHG | DIASTOLIC BLOOD PRESSURE: 52 MMHG

## 2021-08-26 VITALS — SYSTOLIC BLOOD PRESSURE: 129 MMHG | DIASTOLIC BLOOD PRESSURE: 55 MMHG

## 2021-08-26 VITALS — DIASTOLIC BLOOD PRESSURE: 50 MMHG | SYSTOLIC BLOOD PRESSURE: 107 MMHG

## 2021-08-26 VITALS — SYSTOLIC BLOOD PRESSURE: 160 MMHG | DIASTOLIC BLOOD PRESSURE: 58 MMHG

## 2021-08-26 VITALS — SYSTOLIC BLOOD PRESSURE: 109 MMHG | DIASTOLIC BLOOD PRESSURE: 46 MMHG

## 2021-08-26 VITALS — DIASTOLIC BLOOD PRESSURE: 58 MMHG | SYSTOLIC BLOOD PRESSURE: 124 MMHG

## 2021-08-26 VITALS — SYSTOLIC BLOOD PRESSURE: 129 MMHG | DIASTOLIC BLOOD PRESSURE: 56 MMHG

## 2021-08-26 VITALS — DIASTOLIC BLOOD PRESSURE: 45 MMHG | SYSTOLIC BLOOD PRESSURE: 99 MMHG

## 2021-08-26 VITALS — SYSTOLIC BLOOD PRESSURE: 127 MMHG | DIASTOLIC BLOOD PRESSURE: 49 MMHG

## 2021-08-26 VITALS — SYSTOLIC BLOOD PRESSURE: 108 MMHG | DIASTOLIC BLOOD PRESSURE: 48 MMHG

## 2021-08-26 VITALS — DIASTOLIC BLOOD PRESSURE: 55 MMHG | SYSTOLIC BLOOD PRESSURE: 126 MMHG

## 2021-08-26 VITALS — SYSTOLIC BLOOD PRESSURE: 131 MMHG | DIASTOLIC BLOOD PRESSURE: 50 MMHG

## 2021-08-26 VITALS — SYSTOLIC BLOOD PRESSURE: 128 MMHG | DIASTOLIC BLOOD PRESSURE: 51 MMHG

## 2021-08-26 VITALS — SYSTOLIC BLOOD PRESSURE: 125 MMHG | DIASTOLIC BLOOD PRESSURE: 51 MMHG

## 2021-08-26 VITALS — SYSTOLIC BLOOD PRESSURE: 118 MMHG | DIASTOLIC BLOOD PRESSURE: 47 MMHG

## 2021-08-26 VITALS — DIASTOLIC BLOOD PRESSURE: 48 MMHG | SYSTOLIC BLOOD PRESSURE: 110 MMHG

## 2021-08-26 VITALS — SYSTOLIC BLOOD PRESSURE: 120 MMHG | DIASTOLIC BLOOD PRESSURE: 51 MMHG

## 2021-08-26 VITALS — DIASTOLIC BLOOD PRESSURE: 49 MMHG | SYSTOLIC BLOOD PRESSURE: 110 MMHG

## 2021-08-26 VITALS — DIASTOLIC BLOOD PRESSURE: 53 MMHG | SYSTOLIC BLOOD PRESSURE: 123 MMHG

## 2021-08-26 VITALS — SYSTOLIC BLOOD PRESSURE: 122 MMHG | DIASTOLIC BLOOD PRESSURE: 56 MMHG

## 2021-08-26 VITALS — DIASTOLIC BLOOD PRESSURE: 59 MMHG | SYSTOLIC BLOOD PRESSURE: 129 MMHG

## 2021-08-26 VITALS — DIASTOLIC BLOOD PRESSURE: 52 MMHG | SYSTOLIC BLOOD PRESSURE: 121 MMHG

## 2021-08-26 VITALS — SYSTOLIC BLOOD PRESSURE: 161 MMHG | DIASTOLIC BLOOD PRESSURE: 67 MMHG

## 2021-08-26 VITALS — SYSTOLIC BLOOD PRESSURE: 85 MMHG | DIASTOLIC BLOOD PRESSURE: 40 MMHG

## 2021-08-26 VITALS — DIASTOLIC BLOOD PRESSURE: 54 MMHG | SYSTOLIC BLOOD PRESSURE: 120 MMHG

## 2021-08-26 VITALS — SYSTOLIC BLOOD PRESSURE: 105 MMHG | DIASTOLIC BLOOD PRESSURE: 38 MMHG

## 2021-08-26 VITALS — SYSTOLIC BLOOD PRESSURE: 170 MMHG | DIASTOLIC BLOOD PRESSURE: 71 MMHG

## 2021-08-26 VITALS — DIASTOLIC BLOOD PRESSURE: 52 MMHG | SYSTOLIC BLOOD PRESSURE: 119 MMHG

## 2021-08-26 VITALS — DIASTOLIC BLOOD PRESSURE: 70 MMHG | SYSTOLIC BLOOD PRESSURE: 168 MMHG

## 2021-08-26 VITALS — DIASTOLIC BLOOD PRESSURE: 59 MMHG | SYSTOLIC BLOOD PRESSURE: 126 MMHG

## 2021-08-26 VITALS — DIASTOLIC BLOOD PRESSURE: 50 MMHG | SYSTOLIC BLOOD PRESSURE: 125 MMHG

## 2021-08-26 VITALS — SYSTOLIC BLOOD PRESSURE: 119 MMHG | DIASTOLIC BLOOD PRESSURE: 53 MMHG

## 2021-08-26 VITALS — DIASTOLIC BLOOD PRESSURE: 54 MMHG | SYSTOLIC BLOOD PRESSURE: 135 MMHG

## 2021-08-26 VITALS — SYSTOLIC BLOOD PRESSURE: 125 MMHG | DIASTOLIC BLOOD PRESSURE: 57 MMHG

## 2021-08-26 VITALS — SYSTOLIC BLOOD PRESSURE: 125 MMHG | DIASTOLIC BLOOD PRESSURE: 49 MMHG

## 2021-08-26 VITALS — SYSTOLIC BLOOD PRESSURE: 88 MMHG | DIASTOLIC BLOOD PRESSURE: 51 MMHG

## 2021-08-26 VITALS — SYSTOLIC BLOOD PRESSURE: 131 MMHG | DIASTOLIC BLOOD PRESSURE: 57 MMHG

## 2021-08-26 VITALS — SYSTOLIC BLOOD PRESSURE: 137 MMHG | DIASTOLIC BLOOD PRESSURE: 54 MMHG

## 2021-08-26 VITALS — DIASTOLIC BLOOD PRESSURE: 45 MMHG | SYSTOLIC BLOOD PRESSURE: 113 MMHG

## 2021-08-26 VITALS — DIASTOLIC BLOOD PRESSURE: 50 MMHG | SYSTOLIC BLOOD PRESSURE: 111 MMHG

## 2021-08-26 VITALS — SYSTOLIC BLOOD PRESSURE: 119 MMHG | DIASTOLIC BLOOD PRESSURE: 52 MMHG

## 2021-08-26 VITALS — SYSTOLIC BLOOD PRESSURE: 113 MMHG | DIASTOLIC BLOOD PRESSURE: 48 MMHG

## 2021-08-26 VITALS — SYSTOLIC BLOOD PRESSURE: 123 MMHG | DIASTOLIC BLOOD PRESSURE: 58 MMHG

## 2021-08-26 VITALS — DIASTOLIC BLOOD PRESSURE: 41 MMHG | SYSTOLIC BLOOD PRESSURE: 104 MMHG

## 2021-08-26 VITALS — SYSTOLIC BLOOD PRESSURE: 128 MMHG | DIASTOLIC BLOOD PRESSURE: 60 MMHG

## 2021-08-26 VITALS — DIASTOLIC BLOOD PRESSURE: 55 MMHG | SYSTOLIC BLOOD PRESSURE: 125 MMHG

## 2021-08-26 VITALS — DIASTOLIC BLOOD PRESSURE: 42 MMHG | SYSTOLIC BLOOD PRESSURE: 112 MMHG

## 2021-08-26 VITALS — DIASTOLIC BLOOD PRESSURE: 50 MMHG | SYSTOLIC BLOOD PRESSURE: 128 MMHG

## 2021-08-26 VITALS — DIASTOLIC BLOOD PRESSURE: 57 MMHG | SYSTOLIC BLOOD PRESSURE: 126 MMHG

## 2021-08-26 VITALS — SYSTOLIC BLOOD PRESSURE: 124 MMHG | DIASTOLIC BLOOD PRESSURE: 48 MMHG

## 2021-08-26 VITALS — SYSTOLIC BLOOD PRESSURE: 83 MMHG | DIASTOLIC BLOOD PRESSURE: 35 MMHG

## 2021-08-26 VITALS — DIASTOLIC BLOOD PRESSURE: 53 MMHG | SYSTOLIC BLOOD PRESSURE: 136 MMHG

## 2021-08-26 VITALS — SYSTOLIC BLOOD PRESSURE: 102 MMHG | DIASTOLIC BLOOD PRESSURE: 45 MMHG

## 2021-08-26 VITALS — DIASTOLIC BLOOD PRESSURE: 51 MMHG | SYSTOLIC BLOOD PRESSURE: 117 MMHG

## 2021-08-26 VITALS — SYSTOLIC BLOOD PRESSURE: 107 MMHG | DIASTOLIC BLOOD PRESSURE: 46 MMHG

## 2021-08-26 VITALS — SYSTOLIC BLOOD PRESSURE: 103 MMHG | DIASTOLIC BLOOD PRESSURE: 47 MMHG

## 2021-08-26 LAB
ABSOLUTE BASOPHILS: 0 THOU/UL (ref 0–0.2)
ABSOLUTE EOSINOPHILS: 0 THOU/UL (ref 0–0.7)
ABSOLUTE MONOCYTES: 0.2 THOU/UL (ref 0–1.2)
ALBUMIN SERPL-MCNC: 1.9 G/DL (ref 3.4–5)
ALP SERPL-CCNC: 111 U/L (ref 46–116)
ALT SERPL-CCNC: 142 U/L (ref 30–65)
ANION GAP SERPL CALC-SCNC: 8 MMOL/L (ref 7–16)
AST SERPL-CCNC: 66 U/L (ref 15–37)
BASOPHILS NFR BLD AUTO: 0.2 %
BE: -1.7 MMOL/L
BILIRUB SERPL-MCNC: 3 MG/DL
BUN SERPL-MCNC: 50 MG/DL (ref 7–18)
CALCIUM SERPL-MCNC: 8.6 MG/DL (ref 8.5–10.1)
CHLORIDE SERPL-SCNC: 110 MMOL/L (ref 98–107)
CO2 SERPL-SCNC: 25 MMOL/L (ref 21–32)
CREAT SERPL-MCNC: 0.8 MG/DL (ref 0.6–1.3)
EOSINOPHIL NFR BLD: 0.2 %
GLUCOSE SERPL-MCNC: 108 MG/DL (ref 70–99)
GRANULOCYTES NFR BLD MANUAL: 93.9 %
HCT VFR BLD CALC: 29.2 % (ref 37–47)
HGB BLD-MCNC: 10.1 GM/DL (ref 12–15)
LYMPHOCYTES # BLD: 0.4 THOU/UL (ref 0.8–5.3)
LYMPHOCYTES NFR BLD AUTO: 3.7 %
MCH RBC QN AUTO: 33.9 PG (ref 26–34)
MCHC RBC AUTO-ENTMCNC: 34.4 G/DL (ref 28–37)
MCV RBC: 98.4 FL (ref 80–100)
MONOCYTES NFR BLD: 2 %
MPV: 9.4 FL. (ref 7.2–11.1)
NEUTROPHILS # BLD: 9.6 THOU/UL (ref 1.6–8.1)
NUCLEATED RBCS: 1 /100WBC
PCO2 BLD: 33.3 MMHG (ref 35–45)
PLATELET COUNT*: 96 THOU/UL (ref 150–400)
PO2 BLD: 65.5 MMHG (ref 75–100)
POTASSIUM SERPL-SCNC: 3.4 MMOL/L (ref 3.5–5.1)
PREALB SERPL-MCNC: 11.8 MG/DL (ref 18–35.7)
PROT SERPL-MCNC: 5.4 G/DL (ref 6.4–8.2)
RBC # BLD AUTO: 2.97 MIL/UL (ref 4.2–5)
RDW-CV: 15.1 % (ref 10.5–14.5)
SODIUM SERPL-SCNC: 143 MMOL/L (ref 136–145)
WBC # BLD AUTO: 10.3 THOU/UL (ref 4–11)

## 2021-08-27 VITALS — DIASTOLIC BLOOD PRESSURE: 48 MMHG | SYSTOLIC BLOOD PRESSURE: 134 MMHG

## 2021-08-27 VITALS — SYSTOLIC BLOOD PRESSURE: 137 MMHG | DIASTOLIC BLOOD PRESSURE: 56 MMHG

## 2021-08-27 VITALS — DIASTOLIC BLOOD PRESSURE: 55 MMHG | SYSTOLIC BLOOD PRESSURE: 113 MMHG

## 2021-08-27 VITALS — SYSTOLIC BLOOD PRESSURE: 130 MMHG | DIASTOLIC BLOOD PRESSURE: 54 MMHG

## 2021-08-27 VITALS — DIASTOLIC BLOOD PRESSURE: 53 MMHG | SYSTOLIC BLOOD PRESSURE: 117 MMHG

## 2021-08-27 VITALS — DIASTOLIC BLOOD PRESSURE: 53 MMHG | SYSTOLIC BLOOD PRESSURE: 125 MMHG

## 2021-08-27 VITALS — SYSTOLIC BLOOD PRESSURE: 147 MMHG | DIASTOLIC BLOOD PRESSURE: 65 MMHG

## 2021-08-27 VITALS — SYSTOLIC BLOOD PRESSURE: 142 MMHG | DIASTOLIC BLOOD PRESSURE: 63 MMHG

## 2021-08-27 VITALS — DIASTOLIC BLOOD PRESSURE: 55 MMHG | SYSTOLIC BLOOD PRESSURE: 111 MMHG

## 2021-08-27 VITALS — DIASTOLIC BLOOD PRESSURE: 53 MMHG | SYSTOLIC BLOOD PRESSURE: 112 MMHG

## 2021-08-27 VITALS — SYSTOLIC BLOOD PRESSURE: 121 MMHG | DIASTOLIC BLOOD PRESSURE: 50 MMHG

## 2021-08-27 VITALS — DIASTOLIC BLOOD PRESSURE: 54 MMHG | SYSTOLIC BLOOD PRESSURE: 129 MMHG

## 2021-08-27 VITALS — SYSTOLIC BLOOD PRESSURE: 104 MMHG | DIASTOLIC BLOOD PRESSURE: 40 MMHG

## 2021-08-27 VITALS — DIASTOLIC BLOOD PRESSURE: 54 MMHG | SYSTOLIC BLOOD PRESSURE: 124 MMHG

## 2021-08-27 VITALS — SYSTOLIC BLOOD PRESSURE: 129 MMHG | DIASTOLIC BLOOD PRESSURE: 42 MMHG

## 2021-08-27 VITALS — SYSTOLIC BLOOD PRESSURE: 115 MMHG | DIASTOLIC BLOOD PRESSURE: 50 MMHG

## 2021-08-27 VITALS — SYSTOLIC BLOOD PRESSURE: 110 MMHG | DIASTOLIC BLOOD PRESSURE: 51 MMHG

## 2021-08-27 VITALS — SYSTOLIC BLOOD PRESSURE: 110 MMHG | DIASTOLIC BLOOD PRESSURE: 52 MMHG

## 2021-08-27 VITALS — DIASTOLIC BLOOD PRESSURE: 49 MMHG | SYSTOLIC BLOOD PRESSURE: 108 MMHG

## 2021-08-27 VITALS — DIASTOLIC BLOOD PRESSURE: 50 MMHG | SYSTOLIC BLOOD PRESSURE: 113 MMHG

## 2021-08-27 VITALS — DIASTOLIC BLOOD PRESSURE: 62 MMHG | SYSTOLIC BLOOD PRESSURE: 134 MMHG

## 2021-08-27 VITALS — SYSTOLIC BLOOD PRESSURE: 111 MMHG | DIASTOLIC BLOOD PRESSURE: 51 MMHG

## 2021-08-27 VITALS — SYSTOLIC BLOOD PRESSURE: 111 MMHG | DIASTOLIC BLOOD PRESSURE: 50 MMHG

## 2021-08-27 VITALS — SYSTOLIC BLOOD PRESSURE: 128 MMHG | DIASTOLIC BLOOD PRESSURE: 43 MMHG

## 2021-08-27 VITALS — DIASTOLIC BLOOD PRESSURE: 51 MMHG | SYSTOLIC BLOOD PRESSURE: 114 MMHG

## 2021-08-27 VITALS — DIASTOLIC BLOOD PRESSURE: 51 MMHG | SYSTOLIC BLOOD PRESSURE: 116 MMHG

## 2021-08-27 VITALS — SYSTOLIC BLOOD PRESSURE: 118 MMHG | DIASTOLIC BLOOD PRESSURE: 52 MMHG

## 2021-08-27 VITALS — SYSTOLIC BLOOD PRESSURE: 114 MMHG | DIASTOLIC BLOOD PRESSURE: 51 MMHG

## 2021-08-27 VITALS — DIASTOLIC BLOOD PRESSURE: 52 MMHG | SYSTOLIC BLOOD PRESSURE: 117 MMHG

## 2021-08-27 VITALS — DIASTOLIC BLOOD PRESSURE: 49 MMHG | SYSTOLIC BLOOD PRESSURE: 111 MMHG

## 2021-08-27 VITALS — DIASTOLIC BLOOD PRESSURE: 51 MMHG | SYSTOLIC BLOOD PRESSURE: 136 MMHG

## 2021-08-27 VITALS — SYSTOLIC BLOOD PRESSURE: 138 MMHG | DIASTOLIC BLOOD PRESSURE: 48 MMHG

## 2021-08-27 VITALS — SYSTOLIC BLOOD PRESSURE: 115 MMHG | DIASTOLIC BLOOD PRESSURE: 51 MMHG

## 2021-08-27 VITALS — DIASTOLIC BLOOD PRESSURE: 60 MMHG | SYSTOLIC BLOOD PRESSURE: 140 MMHG

## 2021-08-27 VITALS — DIASTOLIC BLOOD PRESSURE: 52 MMHG | SYSTOLIC BLOOD PRESSURE: 115 MMHG

## 2021-08-27 VITALS — SYSTOLIC BLOOD PRESSURE: 115 MMHG | DIASTOLIC BLOOD PRESSURE: 46 MMHG

## 2021-08-27 VITALS — DIASTOLIC BLOOD PRESSURE: 46 MMHG | SYSTOLIC BLOOD PRESSURE: 111 MMHG

## 2021-08-27 VITALS — DIASTOLIC BLOOD PRESSURE: 54 MMHG | SYSTOLIC BLOOD PRESSURE: 123 MMHG

## 2021-08-27 VITALS — DIASTOLIC BLOOD PRESSURE: 52 MMHG | SYSTOLIC BLOOD PRESSURE: 120 MMHG

## 2021-08-27 VITALS — SYSTOLIC BLOOD PRESSURE: 114 MMHG | DIASTOLIC BLOOD PRESSURE: 49 MMHG

## 2021-08-27 VITALS — DIASTOLIC BLOOD PRESSURE: 47 MMHG | SYSTOLIC BLOOD PRESSURE: 123 MMHG

## 2021-08-27 VITALS — DIASTOLIC BLOOD PRESSURE: 54 MMHG | SYSTOLIC BLOOD PRESSURE: 113 MMHG

## 2021-08-27 LAB
ABSOLUTE BASOPHILS: 0 THOU/UL (ref 0–0.2)
ABSOLUTE EOSINOPHILS: 0 THOU/UL (ref 0–0.7)
ABSOLUTE MONOCYTES: 0.2 THOU/UL (ref 0–1.2)
ABSOLUTE MONOCYTES: 0.6 THOU/UL (ref 0–1.2)
ALBUMIN SERPL-MCNC: 1.7 G/DL (ref 3.4–5)
ALP SERPL-CCNC: 98 U/L (ref 46–116)
ALT SERPL-CCNC: 97 U/L (ref 30–65)
ANION GAP SERPL CALC-SCNC: 13 MMOL/L (ref 7–16)
ANION GAP SERPL CALC-SCNC: 14 MMOL/L (ref 7–16)
AST SERPL-CCNC: 38 U/L (ref 15–37)
BASOPHILS NFR BLD AUTO: 0.2 %
BE: -4.5 MMOL/L
BILIRUB SERPL-MCNC: 1.6 MG/DL
BUN SERPL-MCNC: 51 MG/DL (ref 7–18)
BUN SERPL-MCNC: 55 MG/DL (ref 7–18)
CALCIUM SERPL-MCNC: 7.9 MG/DL (ref 8.5–10.1)
CALCIUM SERPL-MCNC: 8.5 MG/DL (ref 8.5–10.1)
CHLORIDE SERPL-SCNC: 115 MMOL/L (ref 98–107)
CHLORIDE SERPL-SCNC: 116 MMOL/L (ref 98–107)
CO2 SERPL-SCNC: 19 MMOL/L (ref 21–32)
CO2 SERPL-SCNC: 21 MMOL/L (ref 21–32)
CREAT SERPL-MCNC: 0.7 MG/DL (ref 0.6–1.3)
CREAT SERPL-MCNC: 0.7 MG/DL (ref 0.6–1.3)
EOSINOPHIL NFR BLD: 0.1 %
GLUCOSE SERPL-MCNC: 153 MG/DL (ref 70–99)
GLUCOSE SERPL-MCNC: 170 MG/DL (ref 70–99)
GRANULOCYTES NFR BLD MANUAL: 90 %
GRANULOCYTES NFR BLD MANUAL: 94 %
HCT VFR BLD CALC: 24.5 % (ref 37–47)
HCT VFR BLD CALC: 24.6 % (ref 37–47)
HGB BLD-MCNC: 8.3 GM/DL (ref 12–15)
HGB BLD-MCNC: 8.5 GM/DL (ref 12–15)
LYMPHOCYTES # BLD: 0.3 THOU/UL (ref 0.8–5.3)
LYMPHOCYTES # BLD: 0.4 THOU/UL (ref 0.8–5.3)
LYMPHOCYTES NFR BLD AUTO: 3 %
LYMPHOCYTES NFR BLD AUTO: 3.7 %
MAGNESIUM SERPL-MCNC: 2.4 MG/DL (ref 1.8–2.4)
MAGNESIUM SERPL-MCNC: 2.5 MG/DL (ref 1.8–2.4)
MCH RBC QN AUTO: 33.2 PG (ref 26–34)
MCH RBC QN AUTO: 33.8 PG (ref 26–34)
MCHC RBC AUTO-ENTMCNC: 34 G/DL (ref 28–37)
MCHC RBC AUTO-ENTMCNC: 34.5 G/DL (ref 28–37)
MCV RBC: 97.8 FL (ref 80–100)
MCV RBC: 97.8 FL (ref 80–100)
MONOCYTES NFR BLD: 2 %
MONOCYTES NFR BLD: 7 %
MPV: 9.5 FL. (ref 7.2–11.1)
MPV: 9.6 FL. (ref 7.2–11.1)
NEUTROPHILS # BLD: 8.1 THOU/UL (ref 1.6–8.1)
NEUTROPHILS # BLD: 9.7 THOU/UL (ref 1.6–8.1)
NUCLEATED RBCS: 0 /100WBC
NUCLEATED RBCS: 0 /100WBC
PCO2 BLD: 36.5 MMHG (ref 35–45)
PLATELET # BLD EST: ADEQUATE 10*3/UL
PLATELET COUNT*: 78 THOU/UL (ref 150–400)
PLATELET COUNT*: 82 THOU/UL (ref 150–400)
PO2 BLD: 69.7 MMHG (ref 75–100)
POTASSIUM SERPL-SCNC: 3.5 MMOL/L (ref 3.5–5.1)
POTASSIUM SERPL-SCNC: 3.8 MMOL/L (ref 3.5–5.1)
PREALB SERPL-MCNC: 10.5 MG/DL (ref 18–35.7)
PROT SERPL-MCNC: 5.1 G/DL (ref 6.4–8.2)
RBC # BLD AUTO: 2.5 MIL/UL (ref 4.2–5)
RBC # BLD AUTO: 2.51 MIL/UL (ref 4.2–5)
RBC MORPH BLD: NORMAL
RDW-CV: 15.2 % (ref 10.5–14.5)
RDW-CV: 15.6 % (ref 10.5–14.5)
SODIUM SERPL-SCNC: 149 MMOL/L (ref 136–145)
SODIUM SERPL-SCNC: 149 MMOL/L (ref 136–145)
WBC # BLD AUTO: 10.4 THOU/UL (ref 4–11)
WBC # BLD AUTO: 9 THOU/UL (ref 4–11)

## 2021-08-28 VITALS — DIASTOLIC BLOOD PRESSURE: 55 MMHG | SYSTOLIC BLOOD PRESSURE: 131 MMHG

## 2021-08-28 VITALS — SYSTOLIC BLOOD PRESSURE: 135 MMHG | DIASTOLIC BLOOD PRESSURE: 55 MMHG

## 2021-08-28 VITALS — DIASTOLIC BLOOD PRESSURE: 50 MMHG | SYSTOLIC BLOOD PRESSURE: 125 MMHG

## 2021-08-28 VITALS — SYSTOLIC BLOOD PRESSURE: 131 MMHG | DIASTOLIC BLOOD PRESSURE: 52 MMHG

## 2021-08-28 VITALS — DIASTOLIC BLOOD PRESSURE: 55 MMHG | SYSTOLIC BLOOD PRESSURE: 122 MMHG

## 2021-08-28 VITALS — SYSTOLIC BLOOD PRESSURE: 126 MMHG | DIASTOLIC BLOOD PRESSURE: 57 MMHG

## 2021-08-28 VITALS — DIASTOLIC BLOOD PRESSURE: 44 MMHG | SYSTOLIC BLOOD PRESSURE: 115 MMHG

## 2021-08-28 VITALS — DIASTOLIC BLOOD PRESSURE: 55 MMHG | SYSTOLIC BLOOD PRESSURE: 139 MMHG

## 2021-08-28 VITALS — DIASTOLIC BLOOD PRESSURE: 54 MMHG | SYSTOLIC BLOOD PRESSURE: 119 MMHG

## 2021-08-28 VITALS — DIASTOLIC BLOOD PRESSURE: 57 MMHG | SYSTOLIC BLOOD PRESSURE: 129 MMHG

## 2021-08-28 VITALS — SYSTOLIC BLOOD PRESSURE: 129 MMHG | DIASTOLIC BLOOD PRESSURE: 58 MMHG

## 2021-08-28 VITALS — DIASTOLIC BLOOD PRESSURE: 56 MMHG | SYSTOLIC BLOOD PRESSURE: 133 MMHG

## 2021-08-28 VITALS — DIASTOLIC BLOOD PRESSURE: 57 MMHG | SYSTOLIC BLOOD PRESSURE: 135 MMHG

## 2021-08-28 VITALS — DIASTOLIC BLOOD PRESSURE: 52 MMHG | SYSTOLIC BLOOD PRESSURE: 135 MMHG

## 2021-08-28 VITALS — DIASTOLIC BLOOD PRESSURE: 34 MMHG | SYSTOLIC BLOOD PRESSURE: 85 MMHG

## 2021-08-28 VITALS — SYSTOLIC BLOOD PRESSURE: 141 MMHG | DIASTOLIC BLOOD PRESSURE: 61 MMHG

## 2021-08-28 VITALS — DIASTOLIC BLOOD PRESSURE: 45 MMHG | SYSTOLIC BLOOD PRESSURE: 104 MMHG

## 2021-08-28 VITALS — DIASTOLIC BLOOD PRESSURE: 51 MMHG | SYSTOLIC BLOOD PRESSURE: 130 MMHG

## 2021-08-28 VITALS — SYSTOLIC BLOOD PRESSURE: 127 MMHG | DIASTOLIC BLOOD PRESSURE: 58 MMHG

## 2021-08-28 VITALS — DIASTOLIC BLOOD PRESSURE: 51 MMHG | SYSTOLIC BLOOD PRESSURE: 129 MMHG

## 2021-08-28 VITALS — SYSTOLIC BLOOD PRESSURE: 129 MMHG | DIASTOLIC BLOOD PRESSURE: 57 MMHG

## 2021-08-28 VITALS — SYSTOLIC BLOOD PRESSURE: 124 MMHG | DIASTOLIC BLOOD PRESSURE: 53 MMHG

## 2021-08-28 VITALS — SYSTOLIC BLOOD PRESSURE: 118 MMHG | DIASTOLIC BLOOD PRESSURE: 57 MMHG

## 2021-08-28 VITALS — SYSTOLIC BLOOD PRESSURE: 116 MMHG | DIASTOLIC BLOOD PRESSURE: 49 MMHG

## 2021-08-28 VITALS — DIASTOLIC BLOOD PRESSURE: 49 MMHG | SYSTOLIC BLOOD PRESSURE: 112 MMHG

## 2021-08-28 VITALS — SYSTOLIC BLOOD PRESSURE: 127 MMHG | DIASTOLIC BLOOD PRESSURE: 59 MMHG

## 2021-08-28 VITALS — SYSTOLIC BLOOD PRESSURE: 121 MMHG | DIASTOLIC BLOOD PRESSURE: 53 MMHG

## 2021-08-28 VITALS — SYSTOLIC BLOOD PRESSURE: 114 MMHG | DIASTOLIC BLOOD PRESSURE: 55 MMHG

## 2021-08-28 VITALS — SYSTOLIC BLOOD PRESSURE: 124 MMHG | DIASTOLIC BLOOD PRESSURE: 56 MMHG

## 2021-08-28 VITALS — DIASTOLIC BLOOD PRESSURE: 53 MMHG | SYSTOLIC BLOOD PRESSURE: 134 MMHG

## 2021-08-28 VITALS — DIASTOLIC BLOOD PRESSURE: 55 MMHG | SYSTOLIC BLOOD PRESSURE: 127 MMHG

## 2021-08-28 LAB
ABSOLUTE BASOPHILS: 0 THOU/UL (ref 0–0.2)
ABSOLUTE BASOPHILS: 0 THOU/UL (ref 0–0.2)
ABSOLUTE EOSINOPHILS: 0 THOU/UL (ref 0–0.7)
ABSOLUTE EOSINOPHILS: 0 THOU/UL (ref 0–0.7)
ABSOLUTE MONOCYTES: 0.2 THOU/UL (ref 0–1.2)
ABSOLUTE MONOCYTES: 0.2 THOU/UL (ref 0–1.2)
ALBUMIN SERPL-MCNC: 2.3 G/DL (ref 3.4–5)
ALP SERPL-CCNC: 108 U/L (ref 46–116)
ALT SERPL-CCNC: 82 U/L (ref 30–65)
ANION GAP SERPL CALC-SCNC: 10 MMOL/L (ref 7–16)
ANION GAP SERPL CALC-SCNC: 12 MMOL/L (ref 7–16)
AST SERPL-CCNC: 34 U/L (ref 15–37)
BASOPHILS NFR BLD AUTO: 0.1 %
BASOPHILS NFR BLD AUTO: 0.1 %
BE: 0.6 MMOL/L
BILIRUB SERPL-MCNC: 1.1 MG/DL
BUN SERPL-MCNC: 46 MG/DL (ref 7–18)
BUN SERPL-MCNC: 52 MG/DL (ref 7–18)
CALCIUM SERPL-MCNC: 8.5 MG/DL (ref 8.5–10.1)
CALCIUM SERPL-MCNC: 8.6 MG/DL (ref 8.5–10.1)
CHLORIDE SERPL-SCNC: 108 MMOL/L (ref 98–107)
CHLORIDE SERPL-SCNC: 111 MMOL/L (ref 98–107)
CO2 SERPL-SCNC: 22 MMOL/L (ref 21–32)
CO2 SERPL-SCNC: 24 MMOL/L (ref 21–32)
CREAT SERPL-MCNC: 0.7 MG/DL (ref 0.6–1.3)
CREAT SERPL-MCNC: 0.8 MG/DL (ref 0.6–1.3)
EOSINOPHIL NFR BLD: 0 %
EOSINOPHIL NFR BLD: 0 %
GLUCOSE SERPL-MCNC: 181 MG/DL (ref 70–99)
GLUCOSE SERPL-MCNC: 257 MG/DL (ref 70–99)
GRANULOCYTES NFR BLD MANUAL: 95.4 %
GRANULOCYTES NFR BLD MANUAL: 95.5 %
HCT VFR BLD CALC: 24.4 % (ref 37–47)
HCT VFR BLD CALC: 26.8 % (ref 37–47)
HGB BLD-MCNC: 8.4 GM/DL (ref 12–15)
HGB BLD-MCNC: 8.7 GM/DL (ref 12–15)
LYMPHOCYTES # BLD: 0.3 THOU/UL (ref 0.8–5.3)
LYMPHOCYTES # BLD: 0.4 THOU/UL (ref 0.8–5.3)
LYMPHOCYTES NFR BLD AUTO: 2.6 %
LYMPHOCYTES NFR BLD AUTO: 3.1 %
MAGNESIUM SERPL-MCNC: 2.2 MG/DL (ref 1.8–2.4)
MAGNESIUM SERPL-MCNC: 2.5 MG/DL (ref 1.8–2.4)
MCH RBC QN AUTO: 32.2 PG (ref 26–34)
MCH RBC QN AUTO: 33.5 PG (ref 26–34)
MCHC RBC AUTO-ENTMCNC: 32.6 G/DL (ref 28–37)
MCHC RBC AUTO-ENTMCNC: 34.5 G/DL (ref 28–37)
MCV RBC: 97 FL (ref 80–100)
MCV RBC: 98.8 FL (ref 80–100)
MONOCYTES NFR BLD: 1.4 %
MONOCYTES NFR BLD: 1.8 %
MPV: 9.2 FL. (ref 7.2–11.1)
MPV: 9.3 FL. (ref 7.2–11.1)
NEUTROPHILS # BLD: 12 THOU/UL (ref 1.6–8.1)
NEUTROPHILS # BLD: 12.4 THOU/UL (ref 1.6–8.1)
NUCLEATED RBCS: 0 /100WBC
NUCLEATED RBCS: 0 /100WBC
PCO2 BLD: 38.7 MMHG (ref 35–45)
PLATELET COUNT*: 80 THOU/UL (ref 150–400)
PLATELET COUNT*: 85 THOU/UL (ref 150–400)
PO2 BLD: 146.2 MMHG (ref 75–100)
POTASSIUM SERPL-SCNC: 3.9 MMOL/L (ref 3.5–5.1)
POTASSIUM SERPL-SCNC: 4.4 MMOL/L (ref 3.5–5.1)
PROT SERPL-MCNC: 5.5 G/DL (ref 6.4–8.2)
RBC # BLD AUTO: 2.52 MIL/UL (ref 4.2–5)
RBC # BLD AUTO: 2.71 MIL/UL (ref 4.2–5)
RDW-CV: 15.1 % (ref 10.5–14.5)
RDW-CV: 15.8 % (ref 10.5–14.5)
SODIUM SERPL-SCNC: 142 MMOL/L (ref 136–145)
SODIUM SERPL-SCNC: 145 MMOL/L (ref 136–145)
WBC # BLD AUTO: 12.5 THOU/UL (ref 4–11)
WBC # BLD AUTO: 13 THOU/UL (ref 4–11)

## 2021-08-29 VITALS — SYSTOLIC BLOOD PRESSURE: 130 MMHG | DIASTOLIC BLOOD PRESSURE: 60 MMHG

## 2021-08-29 VITALS — SYSTOLIC BLOOD PRESSURE: 143 MMHG | DIASTOLIC BLOOD PRESSURE: 67 MMHG

## 2021-08-29 VITALS — SYSTOLIC BLOOD PRESSURE: 141 MMHG | DIASTOLIC BLOOD PRESSURE: 63 MMHG

## 2021-08-29 VITALS — SYSTOLIC BLOOD PRESSURE: 147 MMHG | DIASTOLIC BLOOD PRESSURE: 70 MMHG

## 2021-08-29 VITALS — SYSTOLIC BLOOD PRESSURE: 126 MMHG | DIASTOLIC BLOOD PRESSURE: 60 MMHG

## 2021-08-29 VITALS — SYSTOLIC BLOOD PRESSURE: 104 MMHG | DIASTOLIC BLOOD PRESSURE: 44 MMHG

## 2021-08-29 VITALS — DIASTOLIC BLOOD PRESSURE: 59 MMHG | SYSTOLIC BLOOD PRESSURE: 122 MMHG

## 2021-08-29 VITALS — SYSTOLIC BLOOD PRESSURE: 148 MMHG | DIASTOLIC BLOOD PRESSURE: 69 MMHG

## 2021-08-29 VITALS — SYSTOLIC BLOOD PRESSURE: 143 MMHG | DIASTOLIC BLOOD PRESSURE: 53 MMHG

## 2021-08-29 VITALS — SYSTOLIC BLOOD PRESSURE: 137 MMHG | DIASTOLIC BLOOD PRESSURE: 70 MMHG

## 2021-08-29 VITALS — DIASTOLIC BLOOD PRESSURE: 55 MMHG | SYSTOLIC BLOOD PRESSURE: 115 MMHG

## 2021-08-29 VITALS — SYSTOLIC BLOOD PRESSURE: 138 MMHG | DIASTOLIC BLOOD PRESSURE: 69 MMHG

## 2021-08-29 VITALS — DIASTOLIC BLOOD PRESSURE: 75 MMHG | SYSTOLIC BLOOD PRESSURE: 177 MMHG

## 2021-08-29 VITALS — SYSTOLIC BLOOD PRESSURE: 130 MMHG | DIASTOLIC BLOOD PRESSURE: 51 MMHG

## 2021-08-29 VITALS — DIASTOLIC BLOOD PRESSURE: 71 MMHG | SYSTOLIC BLOOD PRESSURE: 149 MMHG

## 2021-08-29 VITALS — DIASTOLIC BLOOD PRESSURE: 64 MMHG | SYSTOLIC BLOOD PRESSURE: 137 MMHG

## 2021-08-29 VITALS — DIASTOLIC BLOOD PRESSURE: 58 MMHG | SYSTOLIC BLOOD PRESSURE: 155 MMHG

## 2021-08-29 VITALS — DIASTOLIC BLOOD PRESSURE: 70 MMHG | SYSTOLIC BLOOD PRESSURE: 145 MMHG

## 2021-08-29 VITALS — DIASTOLIC BLOOD PRESSURE: 67 MMHG | SYSTOLIC BLOOD PRESSURE: 170 MMHG

## 2021-08-29 VITALS — SYSTOLIC BLOOD PRESSURE: 143 MMHG | DIASTOLIC BLOOD PRESSURE: 64 MMHG

## 2021-08-29 VITALS — SYSTOLIC BLOOD PRESSURE: 121 MMHG | DIASTOLIC BLOOD PRESSURE: 50 MMHG

## 2021-08-29 VITALS — DIASTOLIC BLOOD PRESSURE: 45 MMHG | SYSTOLIC BLOOD PRESSURE: 111 MMHG

## 2021-08-29 VITALS — DIASTOLIC BLOOD PRESSURE: 50 MMHG | SYSTOLIC BLOOD PRESSURE: 124 MMHG

## 2021-08-29 VITALS — SYSTOLIC BLOOD PRESSURE: 151 MMHG | DIASTOLIC BLOOD PRESSURE: 55 MMHG

## 2021-08-29 LAB
ABSOLUTE MONOCYTES: 0.3 THOU/UL (ref 0–1.2)
ALBUMIN SERPL-MCNC: 2.1 G/DL (ref 3.4–5)
ALP SERPL-CCNC: 141 U/L (ref 46–116)
ALT SERPL-CCNC: 83 U/L (ref 30–65)
ANION GAP SERPL CALC-SCNC: 5 MMOL/L (ref 7–16)
AST SERPL-CCNC: 44 U/L (ref 15–37)
BE: -2.2 MMOL/L
BE: 2.1 MMOL/L
BILIRUB SERPL-MCNC: 0.8 MG/DL
BUN SERPL-MCNC: 43 MG/DL (ref 7–18)
CALCIUM SERPL-MCNC: 8.9 MG/DL (ref 8.5–10.1)
CHLORIDE SERPL-SCNC: 110 MMOL/L (ref 98–107)
CO2 SERPL-SCNC: 26 MMOL/L (ref 21–32)
CREAT SERPL-MCNC: 0.6 MG/DL (ref 0.6–1.3)
GLUCOSE SERPL-MCNC: 173 MG/DL (ref 70–99)
GRANULOCYTES NFR BLD MANUAL: 89 %
HCT VFR BLD CALC: 26.9 % (ref 37–47)
HGB BLD-MCNC: 9 GM/DL (ref 12–15)
LYMPHOCYTES # BLD: 0.3 THOU/UL (ref 0.8–5.3)
LYMPHOCYTES NFR BLD AUTO: 2 %
MAGNESIUM SERPL-MCNC: 2.5 MG/DL (ref 1.8–2.4)
MCH RBC QN AUTO: 33 PG (ref 26–34)
MCHC RBC AUTO-ENTMCNC: 33.5 G/DL (ref 28–37)
MCV RBC: 98.5 FL (ref 80–100)
METAMYELOCYTES NFR BLD: 4 %
MONOCYTES NFR BLD: 2 %
MPV: 9.7 FL. (ref 7.2–11.1)
NEUTROPHILS # BLD: 16.1 THOU/UL (ref 1.6–8.1)
NEUTS BAND NFR BLD: 3 %
NUCLEATED RBCS: 0 /100WBC
PCO2 BLD: 40.2 MMHG (ref 35–45)
PCO2 BLD: 41.9 MMHG (ref 35–45)
PLATELET # BLD EST: ADEQUATE 10*3/UL
PLATELET COUNT*: 98 THOU/UL (ref 150–400)
PO2 BLD: 70.2 MMHG (ref 75–100)
PO2 BLD: 72 MMHG (ref 75–100)
POTASSIUM SERPL-SCNC: 5 MMOL/L (ref 3.5–5.1)
PROT SERPL-MCNC: 5.6 G/DL (ref 6.4–8.2)
RBC # BLD AUTO: 2.73 MIL/UL (ref 4.2–5)
RBC MORPH BLD: NORMAL
RDW-CV: 15.8 % (ref 10.5–14.5)
SODIUM SERPL-SCNC: 141 MMOL/L (ref 136–145)
WBC # BLD AUTO: 16.8 THOU/UL (ref 4–11)

## 2021-08-30 VITALS — SYSTOLIC BLOOD PRESSURE: 191 MMHG | DIASTOLIC BLOOD PRESSURE: 70 MMHG

## 2021-08-30 VITALS — DIASTOLIC BLOOD PRESSURE: 62 MMHG | SYSTOLIC BLOOD PRESSURE: 170 MMHG

## 2021-08-30 VITALS — DIASTOLIC BLOOD PRESSURE: 71 MMHG | SYSTOLIC BLOOD PRESSURE: 161 MMHG

## 2021-08-30 VITALS — SYSTOLIC BLOOD PRESSURE: 144 MMHG | DIASTOLIC BLOOD PRESSURE: 55 MMHG

## 2021-08-30 VITALS — DIASTOLIC BLOOD PRESSURE: 51 MMHG | SYSTOLIC BLOOD PRESSURE: 130 MMHG

## 2021-08-30 VITALS — SYSTOLIC BLOOD PRESSURE: 176 MMHG | DIASTOLIC BLOOD PRESSURE: 64 MMHG

## 2021-08-30 VITALS — DIASTOLIC BLOOD PRESSURE: 68 MMHG | SYSTOLIC BLOOD PRESSURE: 185 MMHG

## 2021-08-30 VITALS — DIASTOLIC BLOOD PRESSURE: 61 MMHG | SYSTOLIC BLOOD PRESSURE: 158 MMHG

## 2021-08-30 VITALS — DIASTOLIC BLOOD PRESSURE: 55 MMHG | SYSTOLIC BLOOD PRESSURE: 129 MMHG

## 2021-08-30 VITALS — DIASTOLIC BLOOD PRESSURE: 58 MMHG | SYSTOLIC BLOOD PRESSURE: 160 MMHG

## 2021-08-30 VITALS — SYSTOLIC BLOOD PRESSURE: 164 MMHG | DIASTOLIC BLOOD PRESSURE: 66 MMHG

## 2021-08-30 VITALS — DIASTOLIC BLOOD PRESSURE: 70 MMHG | SYSTOLIC BLOOD PRESSURE: 162 MMHG

## 2021-08-30 VITALS — DIASTOLIC BLOOD PRESSURE: 71 MMHG | SYSTOLIC BLOOD PRESSURE: 131 MMHG

## 2021-08-30 VITALS — DIASTOLIC BLOOD PRESSURE: 62 MMHG | SYSTOLIC BLOOD PRESSURE: 172 MMHG

## 2021-08-30 VITALS — SYSTOLIC BLOOD PRESSURE: 185 MMHG | DIASTOLIC BLOOD PRESSURE: 69 MMHG

## 2021-08-30 VITALS — SYSTOLIC BLOOD PRESSURE: 135 MMHG | DIASTOLIC BLOOD PRESSURE: 58 MMHG

## 2021-08-30 VITALS — SYSTOLIC BLOOD PRESSURE: 131 MMHG | DIASTOLIC BLOOD PRESSURE: 56 MMHG

## 2021-08-30 VITALS — SYSTOLIC BLOOD PRESSURE: 137 MMHG | DIASTOLIC BLOOD PRESSURE: 53 MMHG

## 2021-08-30 VITALS — DIASTOLIC BLOOD PRESSURE: 72 MMHG | SYSTOLIC BLOOD PRESSURE: 195 MMHG

## 2021-08-30 VITALS — DIASTOLIC BLOOD PRESSURE: 63 MMHG | SYSTOLIC BLOOD PRESSURE: 179 MMHG

## 2021-08-30 VITALS — DIASTOLIC BLOOD PRESSURE: 54 MMHG | SYSTOLIC BLOOD PRESSURE: 135 MMHG

## 2021-08-30 VITALS — SYSTOLIC BLOOD PRESSURE: 189 MMHG | DIASTOLIC BLOOD PRESSURE: 68 MMHG

## 2021-08-30 VITALS — DIASTOLIC BLOOD PRESSURE: 71 MMHG | SYSTOLIC BLOOD PRESSURE: 187 MMHG

## 2021-08-30 VITALS — SYSTOLIC BLOOD PRESSURE: 152 MMHG | DIASTOLIC BLOOD PRESSURE: 56 MMHG

## 2021-08-30 VITALS — DIASTOLIC BLOOD PRESSURE: 61 MMHG | SYSTOLIC BLOOD PRESSURE: 171 MMHG

## 2021-08-30 VITALS — SYSTOLIC BLOOD PRESSURE: 125 MMHG | DIASTOLIC BLOOD PRESSURE: 54 MMHG

## 2021-08-30 VITALS — DIASTOLIC BLOOD PRESSURE: 71 MMHG | SYSTOLIC BLOOD PRESSURE: 192 MMHG

## 2021-08-30 VITALS — DIASTOLIC BLOOD PRESSURE: 72 MMHG | SYSTOLIC BLOOD PRESSURE: 191 MMHG

## 2021-08-30 VITALS — DIASTOLIC BLOOD PRESSURE: 69 MMHG | SYSTOLIC BLOOD PRESSURE: 162 MMHG

## 2021-08-30 VITALS — SYSTOLIC BLOOD PRESSURE: 140 MMHG | DIASTOLIC BLOOD PRESSURE: 81 MMHG

## 2021-08-30 VITALS — SYSTOLIC BLOOD PRESSURE: 182 MMHG | DIASTOLIC BLOOD PRESSURE: 64 MMHG

## 2021-08-30 VITALS — SYSTOLIC BLOOD PRESSURE: 188 MMHG | DIASTOLIC BLOOD PRESSURE: 68 MMHG

## 2021-08-30 VITALS — SYSTOLIC BLOOD PRESSURE: 201 MMHG | DIASTOLIC BLOOD PRESSURE: 87 MMHG

## 2021-08-30 VITALS — SYSTOLIC BLOOD PRESSURE: 142 MMHG | DIASTOLIC BLOOD PRESSURE: 53 MMHG

## 2021-08-30 VITALS — SYSTOLIC BLOOD PRESSURE: 172 MMHG | DIASTOLIC BLOOD PRESSURE: 65 MMHG

## 2021-08-30 VITALS — DIASTOLIC BLOOD PRESSURE: 52 MMHG | SYSTOLIC BLOOD PRESSURE: 124 MMHG

## 2021-08-30 VITALS — SYSTOLIC BLOOD PRESSURE: 179 MMHG | DIASTOLIC BLOOD PRESSURE: 66 MMHG

## 2021-08-30 VITALS — DIASTOLIC BLOOD PRESSURE: 68 MMHG | SYSTOLIC BLOOD PRESSURE: 162 MMHG

## 2021-08-30 VITALS — SYSTOLIC BLOOD PRESSURE: 194 MMHG | DIASTOLIC BLOOD PRESSURE: 69 MMHG

## 2021-08-30 VITALS — DIASTOLIC BLOOD PRESSURE: 56 MMHG | SYSTOLIC BLOOD PRESSURE: 141 MMHG

## 2021-08-30 VITALS — SYSTOLIC BLOOD PRESSURE: 145 MMHG | DIASTOLIC BLOOD PRESSURE: 61 MMHG

## 2021-08-30 VITALS — DIASTOLIC BLOOD PRESSURE: 71 MMHG | SYSTOLIC BLOOD PRESSURE: 181 MMHG

## 2021-08-30 VITALS — SYSTOLIC BLOOD PRESSURE: 162 MMHG | DIASTOLIC BLOOD PRESSURE: 61 MMHG

## 2021-08-30 VITALS — SYSTOLIC BLOOD PRESSURE: 155 MMHG | DIASTOLIC BLOOD PRESSURE: 60 MMHG

## 2021-08-30 VITALS — SYSTOLIC BLOOD PRESSURE: 141 MMHG | DIASTOLIC BLOOD PRESSURE: 60 MMHG

## 2021-08-30 VITALS — SYSTOLIC BLOOD PRESSURE: 166 MMHG | DIASTOLIC BLOOD PRESSURE: 57 MMHG

## 2021-08-30 VITALS — SYSTOLIC BLOOD PRESSURE: 162 MMHG | DIASTOLIC BLOOD PRESSURE: 71 MMHG

## 2021-08-30 VITALS — DIASTOLIC BLOOD PRESSURE: 62 MMHG | SYSTOLIC BLOOD PRESSURE: 177 MMHG

## 2021-08-30 VITALS — SYSTOLIC BLOOD PRESSURE: 127 MMHG | DIASTOLIC BLOOD PRESSURE: 54 MMHG

## 2021-08-30 VITALS — DIASTOLIC BLOOD PRESSURE: 64 MMHG | SYSTOLIC BLOOD PRESSURE: 170 MMHG

## 2021-08-30 VITALS — DIASTOLIC BLOOD PRESSURE: 73 MMHG | SYSTOLIC BLOOD PRESSURE: 192 MMHG

## 2021-08-30 VITALS — SYSTOLIC BLOOD PRESSURE: 169 MMHG | DIASTOLIC BLOOD PRESSURE: 65 MMHG

## 2021-08-30 LAB
ABSOLUTE BASOPHILS: 0 THOU/UL (ref 0–0.2)
ABSOLUTE EOSINOPHILS: 0.1 THOU/UL (ref 0–0.7)
ABSOLUTE MONOCYTES: 0.2 THOU/UL (ref 0–1.2)
ALBUMIN SERPL-MCNC: 2.6 G/DL (ref 3.4–5)
ALP SERPL-CCNC: 121 U/L (ref 46–116)
ALT SERPL-CCNC: 67 U/L (ref 30–65)
ANION GAP SERPL CALC-SCNC: 3 MMOL/L (ref 7–16)
AST SERPL-CCNC: 22 U/L (ref 15–37)
BASOPHILS NFR BLD AUTO: 0.1 %
BE: 2.2 MMOL/L
BILIRUB SERPL-MCNC: 0.8 MG/DL
BUN SERPL-MCNC: 40 MG/DL (ref 7–18)
CALCIUM SERPL-MCNC: 8.9 MG/DL (ref 8.5–10.1)
CHLORIDE SERPL-SCNC: 109 MMOL/L (ref 98–107)
CO2 SERPL-SCNC: 32 MMOL/L (ref 21–32)
CREAT SERPL-MCNC: 0.5 MG/DL (ref 0.6–1.3)
EOSINOPHIL NFR BLD: 0.4 %
GLUCOSE SERPL-MCNC: 144 MG/DL (ref 70–99)
GRANULOCYTES NFR BLD MANUAL: 96.1 %
HCT VFR BLD CALC: 27.7 % (ref 37–47)
HGB BLD-MCNC: 9.3 GM/DL (ref 12–15)
LYMPHOCYTES # BLD: 0.3 THOU/UL (ref 0.8–5.3)
LYMPHOCYTES NFR BLD AUTO: 2.3 %
MAGNESIUM SERPL-MCNC: 2.2 MG/DL (ref 1.8–2.4)
MCH RBC QN AUTO: 32.7 PG (ref 26–34)
MCHC RBC AUTO-ENTMCNC: 33.4 G/DL (ref 28–37)
MCV RBC: 98 FL (ref 80–100)
MONOCYTES NFR BLD: 1.1 %
MPV: 9 FL. (ref 7.2–11.1)
NEUTROPHILS # BLD: 14.5 THOU/UL (ref 1.6–8.1)
NUCLEATED RBCS: 0 /100WBC
PCO2 BLD: 46 MMHG (ref 35–45)
PLATELET COUNT*: 81 THOU/UL (ref 150–400)
PO2 BLD: 83.7 MMHG (ref 75–100)
POTASSIUM SERPL-SCNC: 4.5 MMOL/L (ref 3.5–5.1)
PROT SERPL-MCNC: 5.7 G/DL (ref 6.4–8.2)
RBC # BLD AUTO: 2.83 MIL/UL (ref 4.2–5)
RDW-CV: 15.6 % (ref 10.5–14.5)
SODIUM SERPL-SCNC: 144 MMOL/L (ref 136–145)
WBC # BLD AUTO: 15.1 THOU/UL (ref 4–11)

## 2021-08-31 VITALS — SYSTOLIC BLOOD PRESSURE: 135 MMHG | DIASTOLIC BLOOD PRESSURE: 57 MMHG

## 2021-08-31 VITALS — SYSTOLIC BLOOD PRESSURE: 107 MMHG | DIASTOLIC BLOOD PRESSURE: 50 MMHG

## 2021-08-31 VITALS — DIASTOLIC BLOOD PRESSURE: 51 MMHG | SYSTOLIC BLOOD PRESSURE: 100 MMHG

## 2021-08-31 VITALS — DIASTOLIC BLOOD PRESSURE: 52 MMHG | SYSTOLIC BLOOD PRESSURE: 133 MMHG

## 2021-08-31 VITALS — SYSTOLIC BLOOD PRESSURE: 103 MMHG | DIASTOLIC BLOOD PRESSURE: 49 MMHG

## 2021-08-31 VITALS — DIASTOLIC BLOOD PRESSURE: 66 MMHG | SYSTOLIC BLOOD PRESSURE: 143 MMHG

## 2021-08-31 VITALS — SYSTOLIC BLOOD PRESSURE: 105 MMHG | DIASTOLIC BLOOD PRESSURE: 50 MMHG

## 2021-08-31 VITALS — DIASTOLIC BLOOD PRESSURE: 48 MMHG | SYSTOLIC BLOOD PRESSURE: 105 MMHG

## 2021-08-31 VITALS — DIASTOLIC BLOOD PRESSURE: 49 MMHG | SYSTOLIC BLOOD PRESSURE: 107 MMHG

## 2021-08-31 VITALS — SYSTOLIC BLOOD PRESSURE: 133 MMHG | DIASTOLIC BLOOD PRESSURE: 52 MMHG

## 2021-08-31 VITALS — SYSTOLIC BLOOD PRESSURE: 110 MMHG | DIASTOLIC BLOOD PRESSURE: 50 MMHG

## 2021-08-31 VITALS — DIASTOLIC BLOOD PRESSURE: 47 MMHG | SYSTOLIC BLOOD PRESSURE: 105 MMHG

## 2021-08-31 VITALS — DIASTOLIC BLOOD PRESSURE: 48 MMHG | SYSTOLIC BLOOD PRESSURE: 101 MMHG

## 2021-08-31 VITALS — DIASTOLIC BLOOD PRESSURE: 60 MMHG | SYSTOLIC BLOOD PRESSURE: 136 MMHG

## 2021-08-31 VITALS — DIASTOLIC BLOOD PRESSURE: 46 MMHG | SYSTOLIC BLOOD PRESSURE: 109 MMHG

## 2021-08-31 VITALS — SYSTOLIC BLOOD PRESSURE: 105 MMHG | DIASTOLIC BLOOD PRESSURE: 48 MMHG

## 2021-08-31 VITALS — DIASTOLIC BLOOD PRESSURE: 58 MMHG | SYSTOLIC BLOOD PRESSURE: 128 MMHG

## 2021-08-31 VITALS — SYSTOLIC BLOOD PRESSURE: 106 MMHG | DIASTOLIC BLOOD PRESSURE: 49 MMHG

## 2021-08-31 VITALS — SYSTOLIC BLOOD PRESSURE: 168 MMHG | DIASTOLIC BLOOD PRESSURE: 67 MMHG

## 2021-08-31 VITALS — DIASTOLIC BLOOD PRESSURE: 52 MMHG | SYSTOLIC BLOOD PRESSURE: 108 MMHG

## 2021-08-31 VITALS — DIASTOLIC BLOOD PRESSURE: 50 MMHG | SYSTOLIC BLOOD PRESSURE: 106 MMHG

## 2021-08-31 VITALS — SYSTOLIC BLOOD PRESSURE: 113 MMHG | DIASTOLIC BLOOD PRESSURE: 56 MMHG

## 2021-08-31 VITALS — SYSTOLIC BLOOD PRESSURE: 115 MMHG | DIASTOLIC BLOOD PRESSURE: 50 MMHG

## 2021-08-31 VITALS — DIASTOLIC BLOOD PRESSURE: 57 MMHG | SYSTOLIC BLOOD PRESSURE: 122 MMHG

## 2021-08-31 VITALS — DIASTOLIC BLOOD PRESSURE: 50 MMHG | SYSTOLIC BLOOD PRESSURE: 104 MMHG

## 2021-08-31 VITALS — SYSTOLIC BLOOD PRESSURE: 115 MMHG | DIASTOLIC BLOOD PRESSURE: 55 MMHG

## 2021-08-31 VITALS — SYSTOLIC BLOOD PRESSURE: 149 MMHG | DIASTOLIC BLOOD PRESSURE: 63 MMHG

## 2021-08-31 VITALS — DIASTOLIC BLOOD PRESSURE: 51 MMHG | SYSTOLIC BLOOD PRESSURE: 112 MMHG

## 2021-08-31 VITALS — DIASTOLIC BLOOD PRESSURE: 59 MMHG | SYSTOLIC BLOOD PRESSURE: 123 MMHG

## 2021-08-31 VITALS — DIASTOLIC BLOOD PRESSURE: 49 MMHG | SYSTOLIC BLOOD PRESSURE: 102 MMHG

## 2021-08-31 VITALS — SYSTOLIC BLOOD PRESSURE: 112 MMHG | DIASTOLIC BLOOD PRESSURE: 52 MMHG

## 2021-08-31 LAB
ABSOLUTE BASOPHILS: 0 THOU/UL (ref 0–0.2)
ABSOLUTE EOSINOPHILS: 0 THOU/UL (ref 0–0.7)
ABSOLUTE MONOCYTES: 0.1 THOU/UL (ref 0–1.2)
ALBUMIN SERPL-MCNC: 2.2 G/DL (ref 3.4–5)
ALP SERPL-CCNC: 121 U/L (ref 46–116)
ALT SERPL-CCNC: 61 U/L (ref 30–65)
ANION GAP SERPL CALC-SCNC: 2 MMOL/L (ref 7–16)
ANION GAP SERPL CALC-SCNC: 4 MMOL/L (ref 7–16)
AST SERPL-CCNC: 27 U/L (ref 15–37)
BASOPHILS NFR BLD AUTO: 0.2 %
BE: 5.8 MMOL/L
BILIRUB SERPL-MCNC: 0.8 MG/DL
BUN SERPL-MCNC: 32 MG/DL (ref 7–18)
BUN SERPL-MCNC: 35 MG/DL (ref 7–18)
CALCIUM SERPL-MCNC: 8.7 MG/DL (ref 8.5–10.1)
CALCIUM SERPL-MCNC: 8.9 MG/DL (ref 8.5–10.1)
CHLORIDE SERPL-SCNC: 108 MMOL/L (ref 98–107)
CHLORIDE SERPL-SCNC: 110 MMOL/L (ref 98–107)
CO2 SERPL-SCNC: 33 MMOL/L (ref 21–32)
CO2 SERPL-SCNC: 35 MMOL/L (ref 21–32)
CREAT SERPL-MCNC: 0.5 MG/DL (ref 0.6–1.3)
CREAT SERPL-MCNC: 0.5 MG/DL (ref 0.6–1.3)
EOSINOPHIL NFR BLD: 0 %
GLUCOSE SERPL-MCNC: 145 MG/DL (ref 70–99)
GLUCOSE SERPL-MCNC: 170 MG/DL (ref 70–99)
GRANULOCYTES NFR BLD MANUAL: 97.4 %
HCT VFR BLD CALC: 28.2 % (ref 37–47)
HGB BLD-MCNC: 9.2 GM/DL (ref 12–15)
LYMPHOCYTES # BLD: 0.3 THOU/UL (ref 0.8–5.3)
LYMPHOCYTES NFR BLD AUTO: 1.8 %
MAGNESIUM SERPL-MCNC: 2.2 MG/DL (ref 1.8–2.4)
MAGNESIUM SERPL-MCNC: 2.3 MG/DL (ref 1.8–2.4)
MCH RBC QN AUTO: 32.3 PG (ref 26–34)
MCHC RBC AUTO-ENTMCNC: 32.8 G/DL (ref 28–37)
MCV RBC: 98.5 FL (ref 80–100)
MONOCYTES NFR BLD: 0.6 %
MPV: 9.6 FL. (ref 7.2–11.1)
NEUTROPHILS # BLD: 14.6 THOU/UL (ref 1.6–8.1)
NUCLEATED RBCS: 0 /100WBC
PCO2 BLD: 49.9 MMHG (ref 35–45)
PHOSPHATE SERPL-MCNC: 4.4 MG/DL (ref 2.5–4.9)
PLATELET COUNT*: 85 THOU/UL (ref 150–400)
PO2 BLD: 69 MMHG (ref 75–100)
POTASSIUM SERPL-SCNC: 4.4 MMOL/L (ref 3.5–5.1)
POTASSIUM SERPL-SCNC: 4.5 MMOL/L (ref 3.5–5.1)
PROT SERPL-MCNC: 5.3 G/DL (ref 6.4–8.2)
RBC # BLD AUTO: 2.87 MIL/UL (ref 4.2–5)
RDW-CV: 15.8 % (ref 10.5–14.5)
SODIUM SERPL-SCNC: 145 MMOL/L (ref 136–145)
SODIUM SERPL-SCNC: 147 MMOL/L (ref 136–145)
WBC # BLD AUTO: 15 THOU/UL (ref 4–11)

## 2021-09-01 VITALS — SYSTOLIC BLOOD PRESSURE: 109 MMHG | DIASTOLIC BLOOD PRESSURE: 49 MMHG

## 2021-09-01 VITALS — DIASTOLIC BLOOD PRESSURE: 47 MMHG | SYSTOLIC BLOOD PRESSURE: 101 MMHG

## 2021-09-01 VITALS — SYSTOLIC BLOOD PRESSURE: 100 MMHG | DIASTOLIC BLOOD PRESSURE: 48 MMHG

## 2021-09-01 VITALS — DIASTOLIC BLOOD PRESSURE: 58 MMHG | SYSTOLIC BLOOD PRESSURE: 165 MMHG

## 2021-09-01 VITALS — SYSTOLIC BLOOD PRESSURE: 111 MMHG | DIASTOLIC BLOOD PRESSURE: 49 MMHG

## 2021-09-01 VITALS — SYSTOLIC BLOOD PRESSURE: 129 MMHG | DIASTOLIC BLOOD PRESSURE: 52 MMHG

## 2021-09-01 VITALS — SYSTOLIC BLOOD PRESSURE: 166 MMHG | DIASTOLIC BLOOD PRESSURE: 54 MMHG

## 2021-09-01 VITALS — DIASTOLIC BLOOD PRESSURE: 57 MMHG | SYSTOLIC BLOOD PRESSURE: 160 MMHG

## 2021-09-01 VITALS — SYSTOLIC BLOOD PRESSURE: 103 MMHG | DIASTOLIC BLOOD PRESSURE: 47 MMHG

## 2021-09-01 VITALS — DIASTOLIC BLOOD PRESSURE: 50 MMHG | SYSTOLIC BLOOD PRESSURE: 113 MMHG

## 2021-09-01 VITALS — DIASTOLIC BLOOD PRESSURE: 56 MMHG | SYSTOLIC BLOOD PRESSURE: 167 MMHG

## 2021-09-01 VITALS — SYSTOLIC BLOOD PRESSURE: 173 MMHG | DIASTOLIC BLOOD PRESSURE: 58 MMHG

## 2021-09-01 VITALS — DIASTOLIC BLOOD PRESSURE: 60 MMHG | SYSTOLIC BLOOD PRESSURE: 159 MMHG

## 2021-09-01 VITALS — SYSTOLIC BLOOD PRESSURE: 146 MMHG | DIASTOLIC BLOOD PRESSURE: 56 MMHG

## 2021-09-01 VITALS — DIASTOLIC BLOOD PRESSURE: 57 MMHG | SYSTOLIC BLOOD PRESSURE: 175 MMHG

## 2021-09-01 VITALS — SYSTOLIC BLOOD PRESSURE: 155 MMHG | DIASTOLIC BLOOD PRESSURE: 55 MMHG

## 2021-09-01 VITALS — SYSTOLIC BLOOD PRESSURE: 126 MMHG | DIASTOLIC BLOOD PRESSURE: 55 MMHG

## 2021-09-01 VITALS — SYSTOLIC BLOOD PRESSURE: 113 MMHG | DIASTOLIC BLOOD PRESSURE: 51 MMHG

## 2021-09-01 VITALS — SYSTOLIC BLOOD PRESSURE: 101 MMHG | DIASTOLIC BLOOD PRESSURE: 47 MMHG

## 2021-09-01 VITALS — SYSTOLIC BLOOD PRESSURE: 104 MMHG | DIASTOLIC BLOOD PRESSURE: 48 MMHG

## 2021-09-01 VITALS — SYSTOLIC BLOOD PRESSURE: 106 MMHG | DIASTOLIC BLOOD PRESSURE: 48 MMHG

## 2021-09-01 VITALS — SYSTOLIC BLOOD PRESSURE: 133 MMHG | DIASTOLIC BLOOD PRESSURE: 56 MMHG

## 2021-09-01 VITALS — SYSTOLIC BLOOD PRESSURE: 114 MMHG | DIASTOLIC BLOOD PRESSURE: 50 MMHG

## 2021-09-01 VITALS — DIASTOLIC BLOOD PRESSURE: 55 MMHG | SYSTOLIC BLOOD PRESSURE: 177 MMHG

## 2021-09-01 VITALS — DIASTOLIC BLOOD PRESSURE: 47 MMHG | SYSTOLIC BLOOD PRESSURE: 103 MMHG

## 2021-09-01 VITALS — SYSTOLIC BLOOD PRESSURE: 145 MMHG | DIASTOLIC BLOOD PRESSURE: 59 MMHG

## 2021-09-01 VITALS — SYSTOLIC BLOOD PRESSURE: 172 MMHG | DIASTOLIC BLOOD PRESSURE: 56 MMHG

## 2021-09-01 VITALS — SYSTOLIC BLOOD PRESSURE: 140 MMHG | DIASTOLIC BLOOD PRESSURE: 56 MMHG

## 2021-09-01 VITALS — DIASTOLIC BLOOD PRESSURE: 52 MMHG | SYSTOLIC BLOOD PRESSURE: 164 MMHG

## 2021-09-01 VITALS — SYSTOLIC BLOOD PRESSURE: 170 MMHG | DIASTOLIC BLOOD PRESSURE: 59 MMHG

## 2021-09-01 VITALS — DIASTOLIC BLOOD PRESSURE: 46 MMHG | SYSTOLIC BLOOD PRESSURE: 138 MMHG

## 2021-09-01 VITALS — DIASTOLIC BLOOD PRESSURE: 47 MMHG | SYSTOLIC BLOOD PRESSURE: 111 MMHG

## 2021-09-01 VITALS — DIASTOLIC BLOOD PRESSURE: 54 MMHG | SYSTOLIC BLOOD PRESSURE: 128 MMHG

## 2021-09-01 VITALS — DIASTOLIC BLOOD PRESSURE: 52 MMHG | SYSTOLIC BLOOD PRESSURE: 160 MMHG

## 2021-09-01 VITALS — DIASTOLIC BLOOD PRESSURE: 48 MMHG | SYSTOLIC BLOOD PRESSURE: 113 MMHG

## 2021-09-01 VITALS — DIASTOLIC BLOOD PRESSURE: 49 MMHG | SYSTOLIC BLOOD PRESSURE: 140 MMHG

## 2021-09-01 VITALS — SYSTOLIC BLOOD PRESSURE: 164 MMHG | DIASTOLIC BLOOD PRESSURE: 58 MMHG

## 2021-09-01 LAB
ALBUMIN SERPL-MCNC: 2.1 G/DL (ref 3.4–5)
ALP SERPL-CCNC: 101 U/L (ref 46–116)
ALT SERPL-CCNC: 48 U/L (ref 30–65)
ANION GAP SERPL CALC-SCNC: 5 MMOL/L (ref 7–16)
ANION GAP SERPL CALC-SCNC: 6 MMOL/L (ref 7–16)
ANISOCYTOSIS BLD QL SMEAR: (no result)
AST SERPL-CCNC: 20 U/L (ref 15–37)
BE: 5.8 MMOL/L
BILIRUB SERPL-MCNC: 0.7 MG/DL
BUN SERPL-MCNC: 32 MG/DL (ref 7–18)
BUN SERPL-MCNC: 34 MG/DL (ref 7–18)
CALCIUM SERPL-MCNC: 7.8 MG/DL (ref 8.5–10.1)
CALCIUM SERPL-MCNC: 7.8 MG/DL (ref 8.5–10.1)
CHLORIDE SERPL-SCNC: 103 MMOL/L (ref 98–107)
CHLORIDE SERPL-SCNC: 105 MMOL/L (ref 98–107)
CO2 SERPL-SCNC: 33 MMOL/L (ref 21–32)
CO2 SERPL-SCNC: 34 MMOL/L (ref 21–32)
CREAT SERPL-MCNC: 0.6 MG/DL (ref 0.6–1.3)
CREAT SERPL-MCNC: 0.6 MG/DL (ref 0.6–1.3)
GLUCOSE SERPL-MCNC: 166 MG/DL (ref 70–99)
GLUCOSE SERPL-MCNC: 197 MG/DL (ref 70–99)
GRANULOCYTES NFR BLD MANUAL: 91 %
HCT VFR BLD CALC: 25.5 % (ref 37–47)
HGB BLD-MCNC: 8.4 GM/DL (ref 12–15)
LYMPHOCYTES # BLD: 0.4 THOU/UL (ref 0.8–5.3)
LYMPHOCYTES NFR BLD AUTO: 3 %
MAGNESIUM SERPL-MCNC: 1.9 MG/DL (ref 1.8–2.4)
MAGNESIUM SERPL-MCNC: 2 MG/DL (ref 1.8–2.4)
MCH RBC QN AUTO: 32.4 PG (ref 26–34)
MCHC RBC AUTO-ENTMCNC: 33 G/DL (ref 28–37)
MCV RBC: 98.2 FL (ref 80–100)
MPV: 9.5 FL. (ref 7.2–11.1)
MYELOCYTES NFR BLD: 1 %
NEUTROPHILS # BLD: 12.8 THOU/UL (ref 1.6–8.1)
NEUTS BAND NFR BLD: 5 %
NUCLEATED RBCS: 0 /100WBC
PCO2 BLD: 49.6 MMHG (ref 35–45)
PLATELET # BLD EST: (no result) 10*3/UL
PLATELET COUNT*: 79 THOU/UL (ref 150–400)
PO2 BLD: 72.7 MMHG (ref 75–100)
POIKILOCYTOSIS BLD QL SMEAR: (no result)
POTASSIUM SERPL-SCNC: 3.5 MMOL/L (ref 3.5–5.1)
POTASSIUM SERPL-SCNC: 4.1 MMOL/L (ref 3.5–5.1)
PROT SERPL-MCNC: 5 G/DL (ref 6.4–8.2)
RBC # BLD AUTO: 2.6 MIL/UL (ref 4.2–5)
RDW-CV: 15.3 % (ref 10.5–14.5)
SODIUM SERPL-SCNC: 141 MMOL/L (ref 136–145)
SODIUM SERPL-SCNC: 145 MMOL/L (ref 136–145)
WBC # BLD AUTO: 13.2 THOU/UL (ref 4–11)

## 2021-09-02 VITALS — SYSTOLIC BLOOD PRESSURE: 161 MMHG | DIASTOLIC BLOOD PRESSURE: 55 MMHG

## 2021-09-02 VITALS — SYSTOLIC BLOOD PRESSURE: 120 MMHG | DIASTOLIC BLOOD PRESSURE: 53 MMHG

## 2021-09-02 VITALS — SYSTOLIC BLOOD PRESSURE: 160 MMHG | DIASTOLIC BLOOD PRESSURE: 55 MMHG

## 2021-09-02 VITALS — SYSTOLIC BLOOD PRESSURE: 205 MMHG | DIASTOLIC BLOOD PRESSURE: 80 MMHG

## 2021-09-02 VITALS — DIASTOLIC BLOOD PRESSURE: 73 MMHG | SYSTOLIC BLOOD PRESSURE: 169 MMHG

## 2021-09-02 VITALS — SYSTOLIC BLOOD PRESSURE: 149 MMHG | DIASTOLIC BLOOD PRESSURE: 56 MMHG

## 2021-09-02 VITALS — SYSTOLIC BLOOD PRESSURE: 117 MMHG | DIASTOLIC BLOOD PRESSURE: 50 MMHG

## 2021-09-02 VITALS — SYSTOLIC BLOOD PRESSURE: 121 MMHG | DIASTOLIC BLOOD PRESSURE: 53 MMHG

## 2021-09-02 VITALS — DIASTOLIC BLOOD PRESSURE: 59 MMHG | SYSTOLIC BLOOD PRESSURE: 182 MMHG

## 2021-09-02 VITALS — SYSTOLIC BLOOD PRESSURE: 149 MMHG | DIASTOLIC BLOOD PRESSURE: 53 MMHG

## 2021-09-02 VITALS — DIASTOLIC BLOOD PRESSURE: 56 MMHG | SYSTOLIC BLOOD PRESSURE: 128 MMHG

## 2021-09-02 VITALS — DIASTOLIC BLOOD PRESSURE: 75 MMHG | SYSTOLIC BLOOD PRESSURE: 170 MMHG

## 2021-09-02 VITALS — DIASTOLIC BLOOD PRESSURE: 73 MMHG | SYSTOLIC BLOOD PRESSURE: 190 MMHG

## 2021-09-02 VITALS — SYSTOLIC BLOOD PRESSURE: 179 MMHG | DIASTOLIC BLOOD PRESSURE: 81 MMHG

## 2021-09-02 VITALS — SYSTOLIC BLOOD PRESSURE: 167 MMHG | DIASTOLIC BLOOD PRESSURE: 76 MMHG

## 2021-09-02 VITALS — SYSTOLIC BLOOD PRESSURE: 118 MMHG | DIASTOLIC BLOOD PRESSURE: 56 MMHG

## 2021-09-02 VITALS — DIASTOLIC BLOOD PRESSURE: 58 MMHG | SYSTOLIC BLOOD PRESSURE: 138 MMHG

## 2021-09-02 VITALS — DIASTOLIC BLOOD PRESSURE: 54 MMHG | SYSTOLIC BLOOD PRESSURE: 115 MMHG

## 2021-09-02 VITALS — SYSTOLIC BLOOD PRESSURE: 204 MMHG | DIASTOLIC BLOOD PRESSURE: 79 MMHG

## 2021-09-02 VITALS — SYSTOLIC BLOOD PRESSURE: 194 MMHG | DIASTOLIC BLOOD PRESSURE: 64 MMHG

## 2021-09-02 VITALS — SYSTOLIC BLOOD PRESSURE: 163 MMHG | DIASTOLIC BLOOD PRESSURE: 58 MMHG

## 2021-09-02 VITALS — SYSTOLIC BLOOD PRESSURE: 157 MMHG | DIASTOLIC BLOOD PRESSURE: 60 MMHG

## 2021-09-02 VITALS — DIASTOLIC BLOOD PRESSURE: 57 MMHG | SYSTOLIC BLOOD PRESSURE: 152 MMHG

## 2021-09-02 VITALS — SYSTOLIC BLOOD PRESSURE: 113 MMHG | DIASTOLIC BLOOD PRESSURE: 53 MMHG

## 2021-09-02 VITALS — SYSTOLIC BLOOD PRESSURE: 137 MMHG | DIASTOLIC BLOOD PRESSURE: 57 MMHG

## 2021-09-02 VITALS — SYSTOLIC BLOOD PRESSURE: 129 MMHG | DIASTOLIC BLOOD PRESSURE: 52 MMHG

## 2021-09-02 VITALS — SYSTOLIC BLOOD PRESSURE: 116 MMHG | DIASTOLIC BLOOD PRESSURE: 50 MMHG

## 2021-09-02 VITALS — DIASTOLIC BLOOD PRESSURE: 55 MMHG | SYSTOLIC BLOOD PRESSURE: 127 MMHG

## 2021-09-02 VITALS — DIASTOLIC BLOOD PRESSURE: 56 MMHG | SYSTOLIC BLOOD PRESSURE: 179 MMHG

## 2021-09-02 VITALS — DIASTOLIC BLOOD PRESSURE: 58 MMHG | SYSTOLIC BLOOD PRESSURE: 172 MMHG

## 2021-09-02 VITALS — SYSTOLIC BLOOD PRESSURE: 189 MMHG | DIASTOLIC BLOOD PRESSURE: 65 MMHG

## 2021-09-02 LAB
ABSOLUTE BASOPHILS: 0 THOU/UL (ref 0–0.2)
ABSOLUTE EOSINOPHILS: 0 THOU/UL (ref 0–0.7)
ABSOLUTE MONOCYTES: 0.2 THOU/UL (ref 0–1.2)
ALBUMIN SERPL-MCNC: 3.2 G/DL (ref 3.4–5)
ALP SERPL-CCNC: 100 U/L (ref 46–116)
ALT SERPL-CCNC: 41 U/L (ref 30–65)
ANION GAP SERPL CALC-SCNC: 4 MMOL/L (ref 7–16)
ANION GAP SERPL CALC-SCNC: 6 MMOL/L (ref 7–16)
AST SERPL-CCNC: 18 U/L (ref 15–37)
BASOPHILS NFR BLD AUTO: 0.1 %
BILIRUB SERPL-MCNC: 0.9 MG/DL
BUN SERPL-MCNC: 26 MG/DL (ref 7–18)
BUN SERPL-MCNC: 28 MG/DL (ref 7–18)
CALCIUM SERPL-MCNC: 7.9 MG/DL (ref 8.5–10.1)
CALCIUM SERPL-MCNC: 8.5 MG/DL (ref 8.5–10.1)
CHLORIDE SERPL-SCNC: 100 MMOL/L (ref 98–107)
CHLORIDE SERPL-SCNC: 102 MMOL/L (ref 98–107)
CO2 SERPL-SCNC: 33 MMOL/L (ref 21–32)
CO2 SERPL-SCNC: 36 MMOL/L (ref 21–32)
CREAT SERPL-MCNC: 0.5 MG/DL (ref 0.6–1.3)
CREAT SERPL-MCNC: 0.5 MG/DL (ref 0.6–1.3)
EOSINOPHIL NFR BLD: 0.2 %
GLUCOSE SERPL-MCNC: 154 MG/DL (ref 70–99)
GLUCOSE SERPL-MCNC: 225 MG/DL (ref 70–99)
GRANULOCYTES NFR BLD MANUAL: 94.9 %
HCT VFR BLD CALC: 24.2 % (ref 37–47)
HGB BLD-MCNC: 8 GM/DL (ref 12–15)
LYMPHOCYTES # BLD: 0.4 THOU/UL (ref 0.8–5.3)
LYMPHOCYTES NFR BLD AUTO: 3 %
MAGNESIUM SERPL-MCNC: 2.8 MG/DL (ref 1.8–2.4)
MCH RBC QN AUTO: 32.4 PG (ref 26–34)
MCHC RBC AUTO-ENTMCNC: 33.2 G/DL (ref 28–37)
MCV RBC: 97.5 FL (ref 80–100)
MONOCYTES NFR BLD: 1.8 %
MPV: 9 FL. (ref 7.2–11.1)
NEUTROPHILS # BLD: 11.4 THOU/UL (ref 1.6–8.1)
NUCLEATED RBCS: 0 /100WBC
PLATELET COUNT*: 100 THOU/UL (ref 150–400)
POTASSIUM SERPL-SCNC: 3.5 MMOL/L (ref 3.5–5.1)
POTASSIUM SERPL-SCNC: 3.6 MMOL/L (ref 3.5–5.1)
PROT SERPL-MCNC: 5.6 G/DL (ref 6.4–8.2)
RBC # BLD AUTO: 2.48 MIL/UL (ref 4.2–5)
RDW-CV: 15.3 % (ref 10.5–14.5)
SODIUM SERPL-SCNC: 139 MMOL/L (ref 136–145)
SODIUM SERPL-SCNC: 142 MMOL/L (ref 136–145)
WBC # BLD AUTO: 12.1 THOU/UL (ref 4–11)

## 2021-09-03 VITALS — DIASTOLIC BLOOD PRESSURE: 68 MMHG | SYSTOLIC BLOOD PRESSURE: 149 MMHG

## 2021-09-03 VITALS — SYSTOLIC BLOOD PRESSURE: 107 MMHG | DIASTOLIC BLOOD PRESSURE: 55 MMHG

## 2021-09-03 VITALS — SYSTOLIC BLOOD PRESSURE: 119 MMHG | DIASTOLIC BLOOD PRESSURE: 56 MMHG

## 2021-09-03 VITALS — SYSTOLIC BLOOD PRESSURE: 160 MMHG | DIASTOLIC BLOOD PRESSURE: 59 MMHG

## 2021-09-03 VITALS — DIASTOLIC BLOOD PRESSURE: 77 MMHG | SYSTOLIC BLOOD PRESSURE: 163 MMHG

## 2021-09-03 VITALS — SYSTOLIC BLOOD PRESSURE: 130 MMHG | DIASTOLIC BLOOD PRESSURE: 53 MMHG

## 2021-09-03 VITALS — SYSTOLIC BLOOD PRESSURE: 135 MMHG | DIASTOLIC BLOOD PRESSURE: 60 MMHG

## 2021-09-03 VITALS — DIASTOLIC BLOOD PRESSURE: 47 MMHG | SYSTOLIC BLOOD PRESSURE: 101 MMHG

## 2021-09-03 VITALS — SYSTOLIC BLOOD PRESSURE: 114 MMHG | DIASTOLIC BLOOD PRESSURE: 55 MMHG

## 2021-09-03 VITALS — DIASTOLIC BLOOD PRESSURE: 44 MMHG | SYSTOLIC BLOOD PRESSURE: 91 MMHG

## 2021-09-03 VITALS — SYSTOLIC BLOOD PRESSURE: 143 MMHG | DIASTOLIC BLOOD PRESSURE: 57 MMHG

## 2021-09-03 VITALS — DIASTOLIC BLOOD PRESSURE: 50 MMHG | SYSTOLIC BLOOD PRESSURE: 103 MMHG

## 2021-09-03 VITALS — DIASTOLIC BLOOD PRESSURE: 48 MMHG | SYSTOLIC BLOOD PRESSURE: 102 MMHG

## 2021-09-03 VITALS — DIASTOLIC BLOOD PRESSURE: 47 MMHG | SYSTOLIC BLOOD PRESSURE: 89 MMHG

## 2021-09-03 VITALS — SYSTOLIC BLOOD PRESSURE: 189 MMHG | DIASTOLIC BLOOD PRESSURE: 70 MMHG

## 2021-09-03 VITALS — DIASTOLIC BLOOD PRESSURE: 44 MMHG | SYSTOLIC BLOOD PRESSURE: 102 MMHG

## 2021-09-03 VITALS — DIASTOLIC BLOOD PRESSURE: 48 MMHG | SYSTOLIC BLOOD PRESSURE: 114 MMHG

## 2021-09-03 VITALS — SYSTOLIC BLOOD PRESSURE: 103 MMHG | DIASTOLIC BLOOD PRESSURE: 49 MMHG

## 2021-09-03 VITALS — DIASTOLIC BLOOD PRESSURE: 51 MMHG | SYSTOLIC BLOOD PRESSURE: 114 MMHG

## 2021-09-03 VITALS — DIASTOLIC BLOOD PRESSURE: 50 MMHG | SYSTOLIC BLOOD PRESSURE: 97 MMHG

## 2021-09-03 VITALS — DIASTOLIC BLOOD PRESSURE: 49 MMHG | SYSTOLIC BLOOD PRESSURE: 136 MMHG

## 2021-09-03 VITALS — DIASTOLIC BLOOD PRESSURE: 54 MMHG | SYSTOLIC BLOOD PRESSURE: 104 MMHG

## 2021-09-03 VITALS — SYSTOLIC BLOOD PRESSURE: 98 MMHG | DIASTOLIC BLOOD PRESSURE: 52 MMHG

## 2021-09-03 VITALS — SYSTOLIC BLOOD PRESSURE: 146 MMHG | DIASTOLIC BLOOD PRESSURE: 75 MMHG

## 2021-09-03 VITALS — SYSTOLIC BLOOD PRESSURE: 101 MMHG | DIASTOLIC BLOOD PRESSURE: 47 MMHG

## 2021-09-03 VITALS — SYSTOLIC BLOOD PRESSURE: 95 MMHG | DIASTOLIC BLOOD PRESSURE: 43 MMHG

## 2021-09-03 VITALS — DIASTOLIC BLOOD PRESSURE: 75 MMHG | SYSTOLIC BLOOD PRESSURE: 163 MMHG

## 2021-09-03 VITALS — SYSTOLIC BLOOD PRESSURE: 117 MMHG | DIASTOLIC BLOOD PRESSURE: 49 MMHG

## 2021-09-03 VITALS — DIASTOLIC BLOOD PRESSURE: 73 MMHG | SYSTOLIC BLOOD PRESSURE: 147 MMHG

## 2021-09-03 VITALS — SYSTOLIC BLOOD PRESSURE: 127 MMHG | DIASTOLIC BLOOD PRESSURE: 49 MMHG

## 2021-09-03 VITALS — SYSTOLIC BLOOD PRESSURE: 110 MMHG | DIASTOLIC BLOOD PRESSURE: 57 MMHG

## 2021-09-03 VITALS — SYSTOLIC BLOOD PRESSURE: 78 MMHG | DIASTOLIC BLOOD PRESSURE: 40 MMHG

## 2021-09-03 VITALS — SYSTOLIC BLOOD PRESSURE: 142 MMHG | DIASTOLIC BLOOD PRESSURE: 53 MMHG

## 2021-09-03 VITALS — DIASTOLIC BLOOD PRESSURE: 67 MMHG | SYSTOLIC BLOOD PRESSURE: 158 MMHG

## 2021-09-03 VITALS — SYSTOLIC BLOOD PRESSURE: 95 MMHG | DIASTOLIC BLOOD PRESSURE: 50 MMHG

## 2021-09-03 VITALS — SYSTOLIC BLOOD PRESSURE: 104 MMHG | DIASTOLIC BLOOD PRESSURE: 52 MMHG

## 2021-09-03 VITALS — DIASTOLIC BLOOD PRESSURE: 47 MMHG | SYSTOLIC BLOOD PRESSURE: 126 MMHG

## 2021-09-03 VITALS — SYSTOLIC BLOOD PRESSURE: 122 MMHG | DIASTOLIC BLOOD PRESSURE: 60 MMHG

## 2021-09-03 VITALS — DIASTOLIC BLOOD PRESSURE: 50 MMHG | SYSTOLIC BLOOD PRESSURE: 140 MMHG

## 2021-09-03 VITALS — SYSTOLIC BLOOD PRESSURE: 103 MMHG | DIASTOLIC BLOOD PRESSURE: 53 MMHG

## 2021-09-03 LAB
ABSOLUTE BASOPHILS: 0 THOU/UL (ref 0–0.2)
ABSOLUTE EOSINOPHILS: 0 THOU/UL (ref 0–0.7)
ABSOLUTE MONOCYTES: 0.2 THOU/UL (ref 0–1.2)
ALBUMIN SERPL-MCNC: 3 G/DL (ref 3.4–5)
ALP SERPL-CCNC: 119 U/L (ref 46–116)
ALT SERPL-CCNC: 50 U/L (ref 30–65)
ANION GAP SERPL CALC-SCNC: 3 MMOL/L (ref 7–16)
ANION GAP SERPL CALC-SCNC: 4 MMOL/L (ref 7–16)
AST SERPL-CCNC: 22 U/L (ref 15–37)
BASOPHILS NFR BLD AUTO: 0.2 %
BE: 9.3 MMOL/L
BILIRUB SERPL-MCNC: 0.9 MG/DL
BUN SERPL-MCNC: 24 MG/DL (ref 7–18)
BUN SERPL-MCNC: 28 MG/DL (ref 7–18)
CALCIUM SERPL-MCNC: 8.9 MG/DL (ref 8.5–10.1)
CALCIUM SERPL-MCNC: 8.9 MG/DL (ref 8.5–10.1)
CHLORIDE SERPL-SCNC: 100 MMOL/L (ref 98–107)
CHLORIDE SERPL-SCNC: 99 MMOL/L (ref 98–107)
CO2 SERPL-SCNC: 34 MMOL/L (ref 21–32)
CO2 SERPL-SCNC: 35 MMOL/L (ref 21–32)
CREAT SERPL-MCNC: 0.5 MG/DL (ref 0.6–1.3)
CREAT SERPL-MCNC: 0.5 MG/DL (ref 0.6–1.3)
EOSINOPHIL NFR BLD: 0 %
GLUCOSE SERPL-MCNC: 200 MG/DL (ref 70–99)
GLUCOSE SERPL-MCNC: 206 MG/DL (ref 70–99)
GRANULOCYTES NFR BLD MANUAL: 96 %
HCT VFR BLD CALC: 29.2 % (ref 37–47)
HGB BLD-MCNC: 9.8 GM/DL (ref 12–15)
LYMPHOCYTES # BLD: 0.4 THOU/UL (ref 0.8–5.3)
LYMPHOCYTES NFR BLD AUTO: 2.4 %
MAGNESIUM SERPL-MCNC: 1.9 MG/DL (ref 1.8–2.4)
MAGNESIUM SERPL-MCNC: 2.1 MG/DL (ref 1.8–2.4)
MCH RBC QN AUTO: 32.2 PG (ref 26–34)
MCHC RBC AUTO-ENTMCNC: 33.5 G/DL (ref 28–37)
MCV RBC: 96.1 FL (ref 80–100)
MONOCYTES NFR BLD: 1.4 %
MPV: 9.1 FL. (ref 7.2–11.1)
NEUTROPHILS # BLD: 14.7 THOU/UL (ref 1.6–8.1)
NUCLEATED RBCS: 0 /100WBC
PCO2 BLD: 42.8 MMHG (ref 35–45)
PLATELET COUNT*: 120 THOU/UL (ref 150–400)
PO2 BLD: 65.7 MMHG (ref 75–100)
POTASSIUM SERPL-SCNC: 3.6 MMOL/L (ref 3.5–5.1)
POTASSIUM SERPL-SCNC: 4.7 MMOL/L (ref 3.5–5.1)
PROT SERPL-MCNC: 5.7 G/DL (ref 6.4–8.2)
RBC # BLD AUTO: 3.04 MIL/UL (ref 4.2–5)
RDW-CV: 14.8 % (ref 10.5–14.5)
SODIUM SERPL-SCNC: 137 MMOL/L (ref 136–145)
SODIUM SERPL-SCNC: 138 MMOL/L (ref 136–145)
WBC # BLD AUTO: 15.3 THOU/UL (ref 4–11)

## 2021-09-04 VITALS — DIASTOLIC BLOOD PRESSURE: 50 MMHG | SYSTOLIC BLOOD PRESSURE: 104 MMHG

## 2021-09-04 VITALS — SYSTOLIC BLOOD PRESSURE: 102 MMHG | DIASTOLIC BLOOD PRESSURE: 52 MMHG

## 2021-09-04 VITALS — SYSTOLIC BLOOD PRESSURE: 103 MMHG | DIASTOLIC BLOOD PRESSURE: 52 MMHG

## 2021-09-04 VITALS — DIASTOLIC BLOOD PRESSURE: 56 MMHG | SYSTOLIC BLOOD PRESSURE: 121 MMHG

## 2021-09-04 VITALS — SYSTOLIC BLOOD PRESSURE: 125 MMHG | DIASTOLIC BLOOD PRESSURE: 60 MMHG

## 2021-09-04 VITALS — SYSTOLIC BLOOD PRESSURE: 90 MMHG | DIASTOLIC BLOOD PRESSURE: 46 MMHG

## 2021-09-04 VITALS — SYSTOLIC BLOOD PRESSURE: 175 MMHG | DIASTOLIC BLOOD PRESSURE: 75 MMHG

## 2021-09-04 VITALS — SYSTOLIC BLOOD PRESSURE: 171 MMHG | DIASTOLIC BLOOD PRESSURE: 69 MMHG

## 2021-09-04 VITALS — SYSTOLIC BLOOD PRESSURE: 174 MMHG | DIASTOLIC BLOOD PRESSURE: 72 MMHG

## 2021-09-04 VITALS — SYSTOLIC BLOOD PRESSURE: 120 MMHG | DIASTOLIC BLOOD PRESSURE: 60 MMHG

## 2021-09-04 VITALS — SYSTOLIC BLOOD PRESSURE: 105 MMHG | DIASTOLIC BLOOD PRESSURE: 50 MMHG

## 2021-09-04 VITALS — DIASTOLIC BLOOD PRESSURE: 52 MMHG | SYSTOLIC BLOOD PRESSURE: 111 MMHG

## 2021-09-04 VITALS — DIASTOLIC BLOOD PRESSURE: 64 MMHG | SYSTOLIC BLOOD PRESSURE: 139 MMHG

## 2021-09-04 VITALS — SYSTOLIC BLOOD PRESSURE: 118 MMHG | DIASTOLIC BLOOD PRESSURE: 57 MMHG

## 2021-09-04 VITALS — DIASTOLIC BLOOD PRESSURE: 65 MMHG | SYSTOLIC BLOOD PRESSURE: 139 MMHG

## 2021-09-04 VITALS — DIASTOLIC BLOOD PRESSURE: 52 MMHG | SYSTOLIC BLOOD PRESSURE: 105 MMHG

## 2021-09-04 VITALS — SYSTOLIC BLOOD PRESSURE: 99 MMHG | DIASTOLIC BLOOD PRESSURE: 47 MMHG

## 2021-09-04 VITALS — SYSTOLIC BLOOD PRESSURE: 109 MMHG | DIASTOLIC BLOOD PRESSURE: 50 MMHG

## 2021-09-04 VITALS — SYSTOLIC BLOOD PRESSURE: 163 MMHG | DIASTOLIC BLOOD PRESSURE: 69 MMHG

## 2021-09-04 VITALS — SYSTOLIC BLOOD PRESSURE: 98 MMHG | DIASTOLIC BLOOD PRESSURE: 48 MMHG

## 2021-09-04 VITALS — DIASTOLIC BLOOD PRESSURE: 49 MMHG | SYSTOLIC BLOOD PRESSURE: 96 MMHG

## 2021-09-04 VITALS — DIASTOLIC BLOOD PRESSURE: 48 MMHG | SYSTOLIC BLOOD PRESSURE: 103 MMHG

## 2021-09-04 VITALS — DIASTOLIC BLOOD PRESSURE: 41 MMHG | SYSTOLIC BLOOD PRESSURE: 83 MMHG

## 2021-09-04 VITALS — SYSTOLIC BLOOD PRESSURE: 130 MMHG | DIASTOLIC BLOOD PRESSURE: 64 MMHG

## 2021-09-04 VITALS — DIASTOLIC BLOOD PRESSURE: 72 MMHG | SYSTOLIC BLOOD PRESSURE: 174 MMHG

## 2021-09-04 VITALS — SYSTOLIC BLOOD PRESSURE: 150 MMHG | DIASTOLIC BLOOD PRESSURE: 67 MMHG

## 2021-09-04 VITALS — SYSTOLIC BLOOD PRESSURE: 109 MMHG | DIASTOLIC BLOOD PRESSURE: 52 MMHG

## 2021-09-04 VITALS — DIASTOLIC BLOOD PRESSURE: 49 MMHG | SYSTOLIC BLOOD PRESSURE: 93 MMHG

## 2021-09-04 VITALS — SYSTOLIC BLOOD PRESSURE: 106 MMHG | DIASTOLIC BLOOD PRESSURE: 52 MMHG

## 2021-09-04 VITALS — DIASTOLIC BLOOD PRESSURE: 58 MMHG | SYSTOLIC BLOOD PRESSURE: 114 MMHG

## 2021-09-04 VITALS — SYSTOLIC BLOOD PRESSURE: 149 MMHG | DIASTOLIC BLOOD PRESSURE: 74 MMHG

## 2021-09-04 VITALS — DIASTOLIC BLOOD PRESSURE: 68 MMHG | SYSTOLIC BLOOD PRESSURE: 146 MMHG

## 2021-09-04 VITALS — SYSTOLIC BLOOD PRESSURE: 156 MMHG | DIASTOLIC BLOOD PRESSURE: 70 MMHG

## 2021-09-04 VITALS — DIASTOLIC BLOOD PRESSURE: 52 MMHG | SYSTOLIC BLOOD PRESSURE: 112 MMHG

## 2021-09-04 VITALS — DIASTOLIC BLOOD PRESSURE: 65 MMHG | SYSTOLIC BLOOD PRESSURE: 141 MMHG

## 2021-09-04 VITALS — DIASTOLIC BLOOD PRESSURE: 50 MMHG | SYSTOLIC BLOOD PRESSURE: 120 MMHG

## 2021-09-04 VITALS — DIASTOLIC BLOOD PRESSURE: 41 MMHG | SYSTOLIC BLOOD PRESSURE: 91 MMHG

## 2021-09-04 VITALS — DIASTOLIC BLOOD PRESSURE: 71 MMHG | SYSTOLIC BLOOD PRESSURE: 178 MMHG

## 2021-09-04 VITALS — SYSTOLIC BLOOD PRESSURE: 152 MMHG | DIASTOLIC BLOOD PRESSURE: 69 MMHG

## 2021-09-04 VITALS — DIASTOLIC BLOOD PRESSURE: 51 MMHG | SYSTOLIC BLOOD PRESSURE: 100 MMHG

## 2021-09-04 VITALS — DIASTOLIC BLOOD PRESSURE: 56 MMHG | SYSTOLIC BLOOD PRESSURE: 115 MMHG

## 2021-09-04 VITALS — SYSTOLIC BLOOD PRESSURE: 178 MMHG | DIASTOLIC BLOOD PRESSURE: 79 MMHG

## 2021-09-04 VITALS — SYSTOLIC BLOOD PRESSURE: 92 MMHG | DIASTOLIC BLOOD PRESSURE: 49 MMHG

## 2021-09-04 VITALS — SYSTOLIC BLOOD PRESSURE: 107 MMHG | DIASTOLIC BLOOD PRESSURE: 50 MMHG

## 2021-09-04 VITALS — SYSTOLIC BLOOD PRESSURE: 135 MMHG | DIASTOLIC BLOOD PRESSURE: 65 MMHG

## 2021-09-04 VITALS — DIASTOLIC BLOOD PRESSURE: 47 MMHG | SYSTOLIC BLOOD PRESSURE: 95 MMHG

## 2021-09-04 VITALS — SYSTOLIC BLOOD PRESSURE: 110 MMHG | DIASTOLIC BLOOD PRESSURE: 52 MMHG

## 2021-09-04 VITALS — SYSTOLIC BLOOD PRESSURE: 162 MMHG | DIASTOLIC BLOOD PRESSURE: 70 MMHG

## 2021-09-04 VITALS — SYSTOLIC BLOOD PRESSURE: 141 MMHG | DIASTOLIC BLOOD PRESSURE: 63 MMHG

## 2021-09-04 VITALS — SYSTOLIC BLOOD PRESSURE: 120 MMHG | DIASTOLIC BLOOD PRESSURE: 58 MMHG

## 2021-09-04 VITALS — SYSTOLIC BLOOD PRESSURE: 103 MMHG | DIASTOLIC BLOOD PRESSURE: 50 MMHG

## 2021-09-04 VITALS — DIASTOLIC BLOOD PRESSURE: 67 MMHG | SYSTOLIC BLOOD PRESSURE: 174 MMHG

## 2021-09-04 VITALS — SYSTOLIC BLOOD PRESSURE: 115 MMHG | DIASTOLIC BLOOD PRESSURE: 54 MMHG

## 2021-09-04 VITALS — DIASTOLIC BLOOD PRESSURE: 40 MMHG | SYSTOLIC BLOOD PRESSURE: 78 MMHG

## 2021-09-04 VITALS — DIASTOLIC BLOOD PRESSURE: 53 MMHG | SYSTOLIC BLOOD PRESSURE: 110 MMHG

## 2021-09-04 VITALS — SYSTOLIC BLOOD PRESSURE: 128 MMHG | DIASTOLIC BLOOD PRESSURE: 61 MMHG

## 2021-09-04 VITALS — DIASTOLIC BLOOD PRESSURE: 74 MMHG | SYSTOLIC BLOOD PRESSURE: 172 MMHG

## 2021-09-04 VITALS — DIASTOLIC BLOOD PRESSURE: 71 MMHG | SYSTOLIC BLOOD PRESSURE: 142 MMHG

## 2021-09-04 VITALS — SYSTOLIC BLOOD PRESSURE: 161 MMHG | DIASTOLIC BLOOD PRESSURE: 67 MMHG

## 2021-09-04 VITALS — DIASTOLIC BLOOD PRESSURE: 61 MMHG | SYSTOLIC BLOOD PRESSURE: 139 MMHG

## 2021-09-04 VITALS — SYSTOLIC BLOOD PRESSURE: 160 MMHG | DIASTOLIC BLOOD PRESSURE: 62 MMHG

## 2021-09-04 VITALS — SYSTOLIC BLOOD PRESSURE: 114 MMHG | DIASTOLIC BLOOD PRESSURE: 51 MMHG

## 2021-09-04 VITALS — DIASTOLIC BLOOD PRESSURE: 49 MMHG | SYSTOLIC BLOOD PRESSURE: 94 MMHG

## 2021-09-04 VITALS — SYSTOLIC BLOOD PRESSURE: 82 MMHG | DIASTOLIC BLOOD PRESSURE: 41 MMHG

## 2021-09-04 VITALS — SYSTOLIC BLOOD PRESSURE: 128 MMHG | DIASTOLIC BLOOD PRESSURE: 60 MMHG

## 2021-09-04 VITALS — DIASTOLIC BLOOD PRESSURE: 52 MMHG | SYSTOLIC BLOOD PRESSURE: 109 MMHG

## 2021-09-04 VITALS — SYSTOLIC BLOOD PRESSURE: 119 MMHG | DIASTOLIC BLOOD PRESSURE: 55 MMHG

## 2021-09-04 VITALS — SYSTOLIC BLOOD PRESSURE: 108 MMHG | DIASTOLIC BLOOD PRESSURE: 53 MMHG

## 2021-09-04 VITALS — SYSTOLIC BLOOD PRESSURE: 92 MMHG | DIASTOLIC BLOOD PRESSURE: 48 MMHG

## 2021-09-04 VITALS — DIASTOLIC BLOOD PRESSURE: 51 MMHG | SYSTOLIC BLOOD PRESSURE: 102 MMHG

## 2021-09-04 VITALS — SYSTOLIC BLOOD PRESSURE: 140 MMHG | DIASTOLIC BLOOD PRESSURE: 69 MMHG

## 2021-09-04 VITALS — SYSTOLIC BLOOD PRESSURE: 101 MMHG | DIASTOLIC BLOOD PRESSURE: 50 MMHG

## 2021-09-04 VITALS — DIASTOLIC BLOOD PRESSURE: 63 MMHG | SYSTOLIC BLOOD PRESSURE: 148 MMHG

## 2021-09-04 VITALS — SYSTOLIC BLOOD PRESSURE: 171 MMHG | DIASTOLIC BLOOD PRESSURE: 71 MMHG

## 2021-09-04 VITALS — SYSTOLIC BLOOD PRESSURE: 130 MMHG | DIASTOLIC BLOOD PRESSURE: 61 MMHG

## 2021-09-04 VITALS — SYSTOLIC BLOOD PRESSURE: 98 MMHG | DIASTOLIC BLOOD PRESSURE: 47 MMHG

## 2021-09-04 VITALS — DIASTOLIC BLOOD PRESSURE: 51 MMHG | SYSTOLIC BLOOD PRESSURE: 114 MMHG

## 2021-09-04 VITALS — SYSTOLIC BLOOD PRESSURE: 157 MMHG | DIASTOLIC BLOOD PRESSURE: 69 MMHG

## 2021-09-04 VITALS — SYSTOLIC BLOOD PRESSURE: 102 MMHG | DIASTOLIC BLOOD PRESSURE: 51 MMHG

## 2021-09-04 VITALS — SYSTOLIC BLOOD PRESSURE: 151 MMHG | DIASTOLIC BLOOD PRESSURE: 68 MMHG

## 2021-09-04 VITALS — SYSTOLIC BLOOD PRESSURE: 169 MMHG | DIASTOLIC BLOOD PRESSURE: 73 MMHG

## 2021-09-04 VITALS — DIASTOLIC BLOOD PRESSURE: 68 MMHG | SYSTOLIC BLOOD PRESSURE: 157 MMHG

## 2021-09-04 VITALS — DIASTOLIC BLOOD PRESSURE: 49 MMHG | SYSTOLIC BLOOD PRESSURE: 106 MMHG

## 2021-09-04 VITALS — SYSTOLIC BLOOD PRESSURE: 97 MMHG | DIASTOLIC BLOOD PRESSURE: 50 MMHG

## 2021-09-04 VITALS — DIASTOLIC BLOOD PRESSURE: 57 MMHG | SYSTOLIC BLOOD PRESSURE: 121 MMHG

## 2021-09-04 LAB
ABSOLUTE BASOPHILS: 0 THOU/UL (ref 0–0.2)
ABSOLUTE EOSINOPHILS: 0 THOU/UL (ref 0–0.7)
ABSOLUTE MONOCYTES: 0.4 THOU/UL (ref 0–1.2)
ALBUMIN SERPL-MCNC: 2.6 G/DL (ref 3.4–5)
ALP SERPL-CCNC: 123 U/L (ref 46–116)
ALT SERPL-CCNC: 44 U/L (ref 30–65)
ANION GAP SERPL CALC-SCNC: 3 MMOL/L (ref 7–16)
AST SERPL-CCNC: 19 U/L (ref 15–37)
BASOPHILS NFR BLD AUTO: 0 %
BE: 8.7 MMOL/L
BILIRUB SERPL-MCNC: 0.8 MG/DL
BUN SERPL-MCNC: 24 MG/DL (ref 7–18)
CALCIUM SERPL-MCNC: 8.2 MG/DL (ref 8.5–10.1)
CHLORIDE SERPL-SCNC: 102 MMOL/L (ref 98–107)
CO2 SERPL-SCNC: 34 MMOL/L (ref 21–32)
CREAT SERPL-MCNC: 0.4 MG/DL (ref 0.6–1.3)
EOSINOPHIL NFR BLD: 0 %
GLUCOSE SERPL-MCNC: 120 MG/DL (ref 70–99)
GRANULOCYTES NFR BLD MANUAL: 95.7 %
HCT VFR BLD CALC: 27.9 % (ref 37–47)
HGB BLD-MCNC: 9.3 GM/DL (ref 12–15)
LYMPHOCYTES # BLD: 0.4 THOU/UL (ref 0.8–5.3)
LYMPHOCYTES NFR BLD AUTO: 2.3 %
MAGNESIUM SERPL-MCNC: 2.4 MG/DL (ref 1.8–2.4)
MCH RBC QN AUTO: 32 PG (ref 26–34)
MCHC RBC AUTO-ENTMCNC: 33.2 G/DL (ref 28–37)
MCV RBC: 96.4 FL (ref 80–100)
MONOCYTES NFR BLD: 2 %
MPV: 9 FL. (ref 7.2–11.1)
NEUTROPHILS # BLD: 17.7 THOU/UL (ref 1.6–8.1)
NUCLEATED RBCS: 0 /100WBC
PCO2 BLD: 47.2 MMHG (ref 35–45)
PLATELET COUNT*: 167 THOU/UL (ref 150–400)
PO2 BLD: 81.8 MMHG (ref 75–100)
POTASSIUM SERPL-SCNC: 4.3 MMOL/L (ref 3.5–5.1)
PROT SERPL-MCNC: 5.4 G/DL (ref 6.4–8.2)
RBC # BLD AUTO: 2.89 MIL/UL (ref 4.2–5)
RDW-CV: 15.1 % (ref 10.5–14.5)
SODIUM SERPL-SCNC: 139 MMOL/L (ref 136–145)
WBC # BLD AUTO: 18.5 THOU/UL (ref 4–11)

## 2021-09-05 VITALS — DIASTOLIC BLOOD PRESSURE: 58 MMHG | SYSTOLIC BLOOD PRESSURE: 123 MMHG

## 2021-09-05 VITALS — SYSTOLIC BLOOD PRESSURE: 114 MMHG | DIASTOLIC BLOOD PRESSURE: 52 MMHG

## 2021-09-05 VITALS — DIASTOLIC BLOOD PRESSURE: 65 MMHG | SYSTOLIC BLOOD PRESSURE: 141 MMHG

## 2021-09-05 VITALS — DIASTOLIC BLOOD PRESSURE: 57 MMHG | SYSTOLIC BLOOD PRESSURE: 111 MMHG

## 2021-09-05 VITALS — DIASTOLIC BLOOD PRESSURE: 51 MMHG | SYSTOLIC BLOOD PRESSURE: 102 MMHG

## 2021-09-05 VITALS — DIASTOLIC BLOOD PRESSURE: 47 MMHG | SYSTOLIC BLOOD PRESSURE: 99 MMHG

## 2021-09-05 VITALS — SYSTOLIC BLOOD PRESSURE: 103 MMHG | DIASTOLIC BLOOD PRESSURE: 49 MMHG

## 2021-09-05 VITALS — DIASTOLIC BLOOD PRESSURE: 70 MMHG | SYSTOLIC BLOOD PRESSURE: 148 MMHG

## 2021-09-05 VITALS — SYSTOLIC BLOOD PRESSURE: 193 MMHG | DIASTOLIC BLOOD PRESSURE: 56 MMHG

## 2021-09-05 VITALS — DIASTOLIC BLOOD PRESSURE: 54 MMHG | SYSTOLIC BLOOD PRESSURE: 138 MMHG

## 2021-09-05 VITALS — SYSTOLIC BLOOD PRESSURE: 137 MMHG | DIASTOLIC BLOOD PRESSURE: 66 MMHG

## 2021-09-05 VITALS — SYSTOLIC BLOOD PRESSURE: 130 MMHG | DIASTOLIC BLOOD PRESSURE: 60 MMHG

## 2021-09-05 VITALS — DIASTOLIC BLOOD PRESSURE: 49 MMHG | SYSTOLIC BLOOD PRESSURE: 104 MMHG

## 2021-09-05 VITALS — DIASTOLIC BLOOD PRESSURE: 63 MMHG | SYSTOLIC BLOOD PRESSURE: 152 MMHG

## 2021-09-05 VITALS — SYSTOLIC BLOOD PRESSURE: 121 MMHG | DIASTOLIC BLOOD PRESSURE: 61 MMHG

## 2021-09-05 VITALS — SYSTOLIC BLOOD PRESSURE: 116 MMHG | DIASTOLIC BLOOD PRESSURE: 54 MMHG

## 2021-09-05 VITALS — DIASTOLIC BLOOD PRESSURE: 60 MMHG | SYSTOLIC BLOOD PRESSURE: 142 MMHG

## 2021-09-05 VITALS — DIASTOLIC BLOOD PRESSURE: 55 MMHG | SYSTOLIC BLOOD PRESSURE: 131 MMHG

## 2021-09-05 VITALS — SYSTOLIC BLOOD PRESSURE: 175 MMHG | DIASTOLIC BLOOD PRESSURE: 79 MMHG

## 2021-09-05 VITALS — SYSTOLIC BLOOD PRESSURE: 101 MMHG | DIASTOLIC BLOOD PRESSURE: 50 MMHG

## 2021-09-05 VITALS — SYSTOLIC BLOOD PRESSURE: 137 MMHG | DIASTOLIC BLOOD PRESSURE: 64 MMHG

## 2021-09-05 VITALS — SYSTOLIC BLOOD PRESSURE: 123 MMHG | DIASTOLIC BLOOD PRESSURE: 60 MMHG

## 2021-09-05 VITALS — SYSTOLIC BLOOD PRESSURE: 147 MMHG | DIASTOLIC BLOOD PRESSURE: 68 MMHG

## 2021-09-05 VITALS — DIASTOLIC BLOOD PRESSURE: 54 MMHG | SYSTOLIC BLOOD PRESSURE: 123 MMHG

## 2021-09-05 VITALS — SYSTOLIC BLOOD PRESSURE: 131 MMHG | DIASTOLIC BLOOD PRESSURE: 60 MMHG

## 2021-09-05 VITALS — DIASTOLIC BLOOD PRESSURE: 56 MMHG | SYSTOLIC BLOOD PRESSURE: 109 MMHG

## 2021-09-05 VITALS — DIASTOLIC BLOOD PRESSURE: 61 MMHG | SYSTOLIC BLOOD PRESSURE: 126 MMHG

## 2021-09-05 VITALS — SYSTOLIC BLOOD PRESSURE: 133 MMHG | DIASTOLIC BLOOD PRESSURE: 59 MMHG

## 2021-09-05 VITALS — SYSTOLIC BLOOD PRESSURE: 133 MMHG | DIASTOLIC BLOOD PRESSURE: 66 MMHG

## 2021-09-05 VITALS — DIASTOLIC BLOOD PRESSURE: 48 MMHG | SYSTOLIC BLOOD PRESSURE: 107 MMHG

## 2021-09-05 VITALS — SYSTOLIC BLOOD PRESSURE: 135 MMHG | DIASTOLIC BLOOD PRESSURE: 55 MMHG

## 2021-09-05 VITALS — DIASTOLIC BLOOD PRESSURE: 67 MMHG | SYSTOLIC BLOOD PRESSURE: 160 MMHG

## 2021-09-05 VITALS — DIASTOLIC BLOOD PRESSURE: 50 MMHG | SYSTOLIC BLOOD PRESSURE: 110 MMHG

## 2021-09-05 VITALS — SYSTOLIC BLOOD PRESSURE: 139 MMHG | DIASTOLIC BLOOD PRESSURE: 66 MMHG

## 2021-09-05 VITALS — DIASTOLIC BLOOD PRESSURE: 60 MMHG | SYSTOLIC BLOOD PRESSURE: 127 MMHG

## 2021-09-05 VITALS — DIASTOLIC BLOOD PRESSURE: 64 MMHG | SYSTOLIC BLOOD PRESSURE: 138 MMHG

## 2021-09-05 VITALS — DIASTOLIC BLOOD PRESSURE: 56 MMHG | SYSTOLIC BLOOD PRESSURE: 117 MMHG

## 2021-09-05 VITALS — DIASTOLIC BLOOD PRESSURE: 68 MMHG | SYSTOLIC BLOOD PRESSURE: 191 MMHG

## 2021-09-05 VITALS — DIASTOLIC BLOOD PRESSURE: 64 MMHG | SYSTOLIC BLOOD PRESSURE: 137 MMHG

## 2021-09-05 VITALS — DIASTOLIC BLOOD PRESSURE: 65 MMHG | SYSTOLIC BLOOD PRESSURE: 137 MMHG

## 2021-09-05 VITALS — SYSTOLIC BLOOD PRESSURE: 115 MMHG | DIASTOLIC BLOOD PRESSURE: 56 MMHG

## 2021-09-05 VITALS — SYSTOLIC BLOOD PRESSURE: 137 MMHG | DIASTOLIC BLOOD PRESSURE: 54 MMHG

## 2021-09-05 VITALS — DIASTOLIC BLOOD PRESSURE: 71 MMHG | SYSTOLIC BLOOD PRESSURE: 147 MMHG

## 2021-09-05 VITALS — SYSTOLIC BLOOD PRESSURE: 129 MMHG | DIASTOLIC BLOOD PRESSURE: 59 MMHG

## 2021-09-05 VITALS — DIASTOLIC BLOOD PRESSURE: 66 MMHG | SYSTOLIC BLOOD PRESSURE: 138 MMHG

## 2021-09-05 VITALS — SYSTOLIC BLOOD PRESSURE: 138 MMHG | DIASTOLIC BLOOD PRESSURE: 66 MMHG

## 2021-09-05 VITALS — SYSTOLIC BLOOD PRESSURE: 103 MMHG | DIASTOLIC BLOOD PRESSURE: 51 MMHG

## 2021-09-05 VITALS — DIASTOLIC BLOOD PRESSURE: 72 MMHG | SYSTOLIC BLOOD PRESSURE: 149 MMHG

## 2021-09-05 VITALS — DIASTOLIC BLOOD PRESSURE: 66 MMHG | SYSTOLIC BLOOD PRESSURE: 141 MMHG

## 2021-09-05 VITALS — SYSTOLIC BLOOD PRESSURE: 152 MMHG | DIASTOLIC BLOOD PRESSURE: 56 MMHG

## 2021-09-05 VITALS — SYSTOLIC BLOOD PRESSURE: 150 MMHG | DIASTOLIC BLOOD PRESSURE: 72 MMHG

## 2021-09-05 VITALS — DIASTOLIC BLOOD PRESSURE: 51 MMHG | SYSTOLIC BLOOD PRESSURE: 106 MMHG

## 2021-09-05 VITALS — SYSTOLIC BLOOD PRESSURE: 97 MMHG | DIASTOLIC BLOOD PRESSURE: 46 MMHG

## 2021-09-05 VITALS — SYSTOLIC BLOOD PRESSURE: 140 MMHG | DIASTOLIC BLOOD PRESSURE: 63 MMHG

## 2021-09-05 VITALS — SYSTOLIC BLOOD PRESSURE: 104 MMHG | DIASTOLIC BLOOD PRESSURE: 55 MMHG

## 2021-09-05 VITALS — DIASTOLIC BLOOD PRESSURE: 57 MMHG | SYSTOLIC BLOOD PRESSURE: 122 MMHG

## 2021-09-05 VITALS — DIASTOLIC BLOOD PRESSURE: 70 MMHG | SYSTOLIC BLOOD PRESSURE: 159 MMHG

## 2021-09-05 VITALS — DIASTOLIC BLOOD PRESSURE: 73 MMHG | SYSTOLIC BLOOD PRESSURE: 152 MMHG

## 2021-09-05 VITALS — SYSTOLIC BLOOD PRESSURE: 139 MMHG | DIASTOLIC BLOOD PRESSURE: 69 MMHG

## 2021-09-05 VITALS — DIASTOLIC BLOOD PRESSURE: 51 MMHG | SYSTOLIC BLOOD PRESSURE: 107 MMHG

## 2021-09-05 VITALS — SYSTOLIC BLOOD PRESSURE: 120 MMHG | DIASTOLIC BLOOD PRESSURE: 56 MMHG

## 2021-09-05 VITALS — SYSTOLIC BLOOD PRESSURE: 109 MMHG | DIASTOLIC BLOOD PRESSURE: 57 MMHG

## 2021-09-05 VITALS — SYSTOLIC BLOOD PRESSURE: 138 MMHG | DIASTOLIC BLOOD PRESSURE: 63 MMHG

## 2021-09-05 VITALS — DIASTOLIC BLOOD PRESSURE: 54 MMHG | SYSTOLIC BLOOD PRESSURE: 130 MMHG

## 2021-09-05 VITALS — SYSTOLIC BLOOD PRESSURE: 132 MMHG | DIASTOLIC BLOOD PRESSURE: 63 MMHG

## 2021-09-05 VITALS — SYSTOLIC BLOOD PRESSURE: 157 MMHG | DIASTOLIC BLOOD PRESSURE: 74 MMHG

## 2021-09-05 VITALS — DIASTOLIC BLOOD PRESSURE: 63 MMHG | SYSTOLIC BLOOD PRESSURE: 193 MMHG

## 2021-09-05 VITALS — DIASTOLIC BLOOD PRESSURE: 44 MMHG | SYSTOLIC BLOOD PRESSURE: 97 MMHG

## 2021-09-05 VITALS — SYSTOLIC BLOOD PRESSURE: 172 MMHG | DIASTOLIC BLOOD PRESSURE: 61 MMHG

## 2021-09-05 VITALS — DIASTOLIC BLOOD PRESSURE: 56 MMHG | SYSTOLIC BLOOD PRESSURE: 104 MMHG

## 2021-09-05 VITALS — SYSTOLIC BLOOD PRESSURE: 139 MMHG | DIASTOLIC BLOOD PRESSURE: 56 MMHG

## 2021-09-05 VITALS — SYSTOLIC BLOOD PRESSURE: 112 MMHG | DIASTOLIC BLOOD PRESSURE: 54 MMHG

## 2021-09-05 VITALS — DIASTOLIC BLOOD PRESSURE: 76 MMHG | SYSTOLIC BLOOD PRESSURE: 162 MMHG

## 2021-09-05 VITALS — DIASTOLIC BLOOD PRESSURE: 56 MMHG | SYSTOLIC BLOOD PRESSURE: 139 MMHG

## 2021-09-05 VITALS — DIASTOLIC BLOOD PRESSURE: 68 MMHG | SYSTOLIC BLOOD PRESSURE: 169 MMHG

## 2021-09-05 VITALS — SYSTOLIC BLOOD PRESSURE: 135 MMHG | DIASTOLIC BLOOD PRESSURE: 65 MMHG

## 2021-09-05 VITALS — SYSTOLIC BLOOD PRESSURE: 121 MMHG | DIASTOLIC BLOOD PRESSURE: 55 MMHG

## 2021-09-05 VITALS — SYSTOLIC BLOOD PRESSURE: 153 MMHG | DIASTOLIC BLOOD PRESSURE: 69 MMHG

## 2021-09-05 VITALS — SYSTOLIC BLOOD PRESSURE: 151 MMHG | DIASTOLIC BLOOD PRESSURE: 58 MMHG

## 2021-09-05 VITALS — DIASTOLIC BLOOD PRESSURE: 62 MMHG | SYSTOLIC BLOOD PRESSURE: 134 MMHG

## 2021-09-05 VITALS — SYSTOLIC BLOOD PRESSURE: 136 MMHG | DIASTOLIC BLOOD PRESSURE: 63 MMHG

## 2021-09-05 VITALS — DIASTOLIC BLOOD PRESSURE: 59 MMHG | SYSTOLIC BLOOD PRESSURE: 125 MMHG

## 2021-09-05 VITALS — DIASTOLIC BLOOD PRESSURE: 54 MMHG | SYSTOLIC BLOOD PRESSURE: 127 MMHG

## 2021-09-05 VITALS — DIASTOLIC BLOOD PRESSURE: 67 MMHG | SYSTOLIC BLOOD PRESSURE: 146 MMHG

## 2021-09-05 VITALS — SYSTOLIC BLOOD PRESSURE: 176 MMHG | DIASTOLIC BLOOD PRESSURE: 64 MMHG

## 2021-09-05 VITALS — DIASTOLIC BLOOD PRESSURE: 54 MMHG | SYSTOLIC BLOOD PRESSURE: 119 MMHG

## 2021-09-05 VITALS — SYSTOLIC BLOOD PRESSURE: 140 MMHG | DIASTOLIC BLOOD PRESSURE: 64 MMHG

## 2021-09-05 VITALS — DIASTOLIC BLOOD PRESSURE: 55 MMHG | SYSTOLIC BLOOD PRESSURE: 111 MMHG

## 2021-09-05 VITALS — SYSTOLIC BLOOD PRESSURE: 142 MMHG | DIASTOLIC BLOOD PRESSURE: 72 MMHG

## 2021-09-05 VITALS — DIASTOLIC BLOOD PRESSURE: 64 MMHG | SYSTOLIC BLOOD PRESSURE: 132 MMHG

## 2021-09-05 LAB
ABSOLUTE MONOCYTES: 0.2 THOU/UL (ref 0–1.2)
ALBUMIN SERPL-MCNC: 2.3 G/DL (ref 3.4–5)
ALP SERPL-CCNC: 107 U/L (ref 46–116)
ALT SERPL-CCNC: 47 U/L (ref 30–65)
ANION GAP SERPL CALC-SCNC: 3 MMOL/L (ref 7–16)
ANISOCYTOSIS BLD QL SMEAR: (no result)
AST SERPL-CCNC: 26 U/L (ref 15–37)
BE: 6 MMOL/L
BILIRUB SERPL-MCNC: 0.8 MG/DL
BUN SERPL-MCNC: 23 MG/DL (ref 7–18)
CALCIUM SERPL-MCNC: 8 MG/DL (ref 8.5–10.1)
CHLORIDE SERPL-SCNC: 105 MMOL/L (ref 98–107)
CO2 SERPL-SCNC: 33 MMOL/L (ref 21–32)
CREAT SERPL-MCNC: 0.3 MG/DL (ref 0.6–1.3)
GLUCOSE SERPL-MCNC: 137 MG/DL (ref 70–99)
GRANULOCYTES NFR BLD MANUAL: 95 %
HCT VFR BLD CALC: 26.6 % (ref 37–47)
HGB BLD-MCNC: 8.9 GM/DL (ref 12–15)
LYMPHOCYTES # BLD: 0.5 THOU/UL (ref 0.8–5.3)
LYMPHOCYTES NFR BLD AUTO: 3 %
MCH RBC QN AUTO: 32.2 PG (ref 26–34)
MCHC RBC AUTO-ENTMCNC: 33.3 G/DL (ref 28–37)
MCV RBC: 96.6 FL (ref 80–100)
MONOCYTES NFR BLD: 1 %
MPV: 8.5 FL. (ref 7.2–11.1)
NEUTROPHILS # BLD: 16 THOU/UL (ref 1.6–8.1)
NEUTS BAND NFR BLD: 1 %
NUCLEATED RBCS: 0 /100WBC
PCO2 BLD: 49.4 MMHG (ref 35–45)
PLATELET # BLD EST: ADEQUATE 10*3/UL
PLATELET COUNT*: 160 THOU/UL (ref 150–400)
PO2 BLD: 61.6 MMHG (ref 75–100)
POIKILOCYTOSIS BLD QL SMEAR: (no result)
POTASSIUM SERPL-SCNC: 4 MMOL/L (ref 3.5–5.1)
PREALB SERPL-MCNC: 20.7 MG/DL (ref 18–35.7)
PROT SERPL-MCNC: 5.2 G/DL (ref 6.4–8.2)
RBC # BLD AUTO: 2.76 MIL/UL (ref 4.2–5)
RDW-CV: 15.2 % (ref 10.5–14.5)
SODIUM SERPL-SCNC: 141 MMOL/L (ref 136–145)
WBC # BLD AUTO: 16.7 THOU/UL (ref 4–11)

## 2021-09-06 VITALS — SYSTOLIC BLOOD PRESSURE: 149 MMHG | DIASTOLIC BLOOD PRESSURE: 68 MMHG

## 2021-09-06 VITALS — SYSTOLIC BLOOD PRESSURE: 155 MMHG | DIASTOLIC BLOOD PRESSURE: 53 MMHG

## 2021-09-06 VITALS — SYSTOLIC BLOOD PRESSURE: 122 MMHG | DIASTOLIC BLOOD PRESSURE: 53 MMHG

## 2021-09-06 VITALS — DIASTOLIC BLOOD PRESSURE: 67 MMHG | SYSTOLIC BLOOD PRESSURE: 139 MMHG

## 2021-09-06 VITALS — DIASTOLIC BLOOD PRESSURE: 53 MMHG | SYSTOLIC BLOOD PRESSURE: 161 MMHG

## 2021-09-06 VITALS — DIASTOLIC BLOOD PRESSURE: 47 MMHG | SYSTOLIC BLOOD PRESSURE: 158 MMHG

## 2021-09-06 VITALS — SYSTOLIC BLOOD PRESSURE: 143 MMHG | DIASTOLIC BLOOD PRESSURE: 54 MMHG

## 2021-09-06 VITALS — DIASTOLIC BLOOD PRESSURE: 51 MMHG | SYSTOLIC BLOOD PRESSURE: 158 MMHG

## 2021-09-06 VITALS — SYSTOLIC BLOOD PRESSURE: 168 MMHG | DIASTOLIC BLOOD PRESSURE: 55 MMHG

## 2021-09-06 VITALS — DIASTOLIC BLOOD PRESSURE: 56 MMHG | SYSTOLIC BLOOD PRESSURE: 165 MMHG

## 2021-09-06 VITALS — DIASTOLIC BLOOD PRESSURE: 64 MMHG | SYSTOLIC BLOOD PRESSURE: 129 MMHG

## 2021-09-06 VITALS — SYSTOLIC BLOOD PRESSURE: 145 MMHG | DIASTOLIC BLOOD PRESSURE: 51 MMHG

## 2021-09-06 VITALS — SYSTOLIC BLOOD PRESSURE: 149 MMHG | DIASTOLIC BLOOD PRESSURE: 45 MMHG

## 2021-09-06 VITALS — DIASTOLIC BLOOD PRESSURE: 63 MMHG | SYSTOLIC BLOOD PRESSURE: 125 MMHG

## 2021-09-06 VITALS — DIASTOLIC BLOOD PRESSURE: 52 MMHG | SYSTOLIC BLOOD PRESSURE: 166 MMHG

## 2021-09-06 VITALS — DIASTOLIC BLOOD PRESSURE: 64 MMHG | SYSTOLIC BLOOD PRESSURE: 134 MMHG

## 2021-09-06 VITALS — SYSTOLIC BLOOD PRESSURE: 198 MMHG | DIASTOLIC BLOOD PRESSURE: 74 MMHG

## 2021-09-06 VITALS — SYSTOLIC BLOOD PRESSURE: 159 MMHG | DIASTOLIC BLOOD PRESSURE: 51 MMHG

## 2021-09-06 VITALS — SYSTOLIC BLOOD PRESSURE: 178 MMHG | DIASTOLIC BLOOD PRESSURE: 67 MMHG

## 2021-09-06 VITALS — DIASTOLIC BLOOD PRESSURE: 48 MMHG | SYSTOLIC BLOOD PRESSURE: 155 MMHG

## 2021-09-06 VITALS — SYSTOLIC BLOOD PRESSURE: 108 MMHG | DIASTOLIC BLOOD PRESSURE: 37 MMHG

## 2021-09-06 VITALS — DIASTOLIC BLOOD PRESSURE: 46 MMHG | SYSTOLIC BLOOD PRESSURE: 122 MMHG

## 2021-09-06 VITALS — SYSTOLIC BLOOD PRESSURE: 153 MMHG | DIASTOLIC BLOOD PRESSURE: 61 MMHG

## 2021-09-06 VITALS — DIASTOLIC BLOOD PRESSURE: 64 MMHG | SYSTOLIC BLOOD PRESSURE: 166 MMHG

## 2021-09-06 VITALS — DIASTOLIC BLOOD PRESSURE: 52 MMHG | SYSTOLIC BLOOD PRESSURE: 158 MMHG

## 2021-09-06 VITALS — DIASTOLIC BLOOD PRESSURE: 46 MMHG | SYSTOLIC BLOOD PRESSURE: 131 MMHG

## 2021-09-06 VITALS — DIASTOLIC BLOOD PRESSURE: 49 MMHG | SYSTOLIC BLOOD PRESSURE: 156 MMHG

## 2021-09-06 VITALS — DIASTOLIC BLOOD PRESSURE: 53 MMHG | SYSTOLIC BLOOD PRESSURE: 176 MMHG

## 2021-09-06 VITALS — SYSTOLIC BLOOD PRESSURE: 152 MMHG | DIASTOLIC BLOOD PRESSURE: 50 MMHG

## 2021-09-06 VITALS — DIASTOLIC BLOOD PRESSURE: 45 MMHG | SYSTOLIC BLOOD PRESSURE: 140 MMHG

## 2021-09-06 VITALS — SYSTOLIC BLOOD PRESSURE: 144 MMHG | DIASTOLIC BLOOD PRESSURE: 63 MMHG

## 2021-09-06 VITALS — SYSTOLIC BLOOD PRESSURE: 171 MMHG | DIASTOLIC BLOOD PRESSURE: 57 MMHG

## 2021-09-06 VITALS — SYSTOLIC BLOOD PRESSURE: 125 MMHG | DIASTOLIC BLOOD PRESSURE: 62 MMHG

## 2021-09-06 VITALS — SYSTOLIC BLOOD PRESSURE: 162 MMHG | DIASTOLIC BLOOD PRESSURE: 52 MMHG

## 2021-09-06 VITALS — SYSTOLIC BLOOD PRESSURE: 127 MMHG | DIASTOLIC BLOOD PRESSURE: 63 MMHG

## 2021-09-06 VITALS — SYSTOLIC BLOOD PRESSURE: 155 MMHG | DIASTOLIC BLOOD PRESSURE: 51 MMHG

## 2021-09-06 VITALS — SYSTOLIC BLOOD PRESSURE: 159 MMHG | DIASTOLIC BLOOD PRESSURE: 48 MMHG

## 2021-09-06 VITALS — DIASTOLIC BLOOD PRESSURE: 51 MMHG | SYSTOLIC BLOOD PRESSURE: 164 MMHG

## 2021-09-06 VITALS — DIASTOLIC BLOOD PRESSURE: 57 MMHG | SYSTOLIC BLOOD PRESSURE: 169 MMHG

## 2021-09-06 VITALS — SYSTOLIC BLOOD PRESSURE: 124 MMHG | DIASTOLIC BLOOD PRESSURE: 63 MMHG

## 2021-09-06 VITALS — DIASTOLIC BLOOD PRESSURE: 52 MMHG | SYSTOLIC BLOOD PRESSURE: 153 MMHG

## 2021-09-06 VITALS — DIASTOLIC BLOOD PRESSURE: 43 MMHG | SYSTOLIC BLOOD PRESSURE: 131 MMHG

## 2021-09-06 VITALS — SYSTOLIC BLOOD PRESSURE: 129 MMHG | DIASTOLIC BLOOD PRESSURE: 56 MMHG

## 2021-09-06 VITALS — SYSTOLIC BLOOD PRESSURE: 111 MMHG | DIASTOLIC BLOOD PRESSURE: 58 MMHG

## 2021-09-06 VITALS — DIASTOLIC BLOOD PRESSURE: 43 MMHG | SYSTOLIC BLOOD PRESSURE: 119 MMHG

## 2021-09-06 LAB
ABSOLUTE BASOPHILS: 0 THOU/UL (ref 0–0.2)
ABSOLUTE EOSINOPHILS: 0 THOU/UL (ref 0–0.7)
ABSOLUTE MONOCYTES: 0.2 THOU/UL (ref 0–1.2)
ALBUMIN SERPL-MCNC: 2.4 G/DL (ref 3.4–5)
ALP SERPL-CCNC: 115 U/L (ref 46–116)
ALT SERPL-CCNC: 51 U/L (ref 30–65)
ANION GAP SERPL CALC-SCNC: 5 MMOL/L (ref 7–16)
AST SERPL-CCNC: 25 U/L (ref 15–37)
BASOPHILS NFR BLD AUTO: 0 %
BE: 3.7 MMOL/L
BE: 7.6 MMOL/L
BILIRUB SERPL-MCNC: 0.7 MG/DL
BUN SERPL-MCNC: 20 MG/DL (ref 7–18)
CALCIUM SERPL-MCNC: 8.5 MG/DL (ref 8.5–10.1)
CHLORIDE SERPL-SCNC: 105 MMOL/L (ref 98–107)
CO2 SERPL-SCNC: 32 MMOL/L (ref 21–32)
CREAT SERPL-MCNC: 0.3 MG/DL (ref 0.6–1.3)
EOSINOPHIL NFR BLD: 0 %
GLUCOSE SERPL-MCNC: 157 MG/DL (ref 70–99)
GRANULOCYTES NFR BLD MANUAL: 97.1 %
HCT VFR BLD CALC: 28.1 % (ref 37–47)
HGB BLD-MCNC: 9.3 GM/DL (ref 12–15)
LYMPHOCYTES # BLD: 0.3 THOU/UL (ref 0.8–5.3)
LYMPHOCYTES NFR BLD AUTO: 1.7 %
MAGNESIUM SERPL-MCNC: 1.9 MG/DL (ref 1.8–2.4)
MCH RBC QN AUTO: 31.9 PG (ref 26–34)
MCHC RBC AUTO-ENTMCNC: 33.2 G/DL (ref 28–37)
MCV RBC: 96.2 FL (ref 80–100)
MONOCYTES NFR BLD: 1.2 %
MPV: 8.4 FL. (ref 7.2–11.1)
NEUTROPHILS # BLD: 16.7 THOU/UL (ref 1.6–8.1)
NUCLEATED RBCS: 0 /100WBC
PCO2 BLD: 51.4 MMHG (ref 35–45)
PCO2 BLD: 52.6 MMHG (ref 35–45)
PHOSPHATE SERPL-MCNC: 2.8 MG/DL (ref 2.5–4.9)
PLATELET COUNT*: 173 THOU/UL (ref 150–400)
PO2 BLD: 63.6 MMHG (ref 75–100)
PO2 BLD: 80 MMHG (ref 75–100)
POTASSIUM SERPL-SCNC: 3.8 MMOL/L (ref 3.5–5.1)
PROT SERPL-MCNC: 5.4 G/DL (ref 6.4–8.2)
RBC # BLD AUTO: 2.92 MIL/UL (ref 4.2–5)
RDW-CV: 15.2 % (ref 10.5–14.5)
SODIUM SERPL-SCNC: 142 MMOL/L (ref 136–145)
WBC # BLD AUTO: 17.2 THOU/UL (ref 4–11)

## 2021-09-07 VITALS — SYSTOLIC BLOOD PRESSURE: 174 MMHG | DIASTOLIC BLOOD PRESSURE: 56 MMHG

## 2021-09-07 VITALS — SYSTOLIC BLOOD PRESSURE: 157 MMHG | DIASTOLIC BLOOD PRESSURE: 52 MMHG

## 2021-09-07 VITALS — DIASTOLIC BLOOD PRESSURE: 74 MMHG | SYSTOLIC BLOOD PRESSURE: 180 MMHG

## 2021-09-07 VITALS — SYSTOLIC BLOOD PRESSURE: 161 MMHG | DIASTOLIC BLOOD PRESSURE: 54 MMHG

## 2021-09-07 VITALS — DIASTOLIC BLOOD PRESSURE: 41 MMHG | SYSTOLIC BLOOD PRESSURE: 108 MMHG

## 2021-09-07 VITALS — SYSTOLIC BLOOD PRESSURE: 154 MMHG | DIASTOLIC BLOOD PRESSURE: 48 MMHG

## 2021-09-07 VITALS — SYSTOLIC BLOOD PRESSURE: 156 MMHG | DIASTOLIC BLOOD PRESSURE: 57 MMHG

## 2021-09-07 VITALS — SYSTOLIC BLOOD PRESSURE: 168 MMHG | DIASTOLIC BLOOD PRESSURE: 68 MMHG

## 2021-09-07 VITALS — SYSTOLIC BLOOD PRESSURE: 157 MMHG | DIASTOLIC BLOOD PRESSURE: 60 MMHG

## 2021-09-07 VITALS — DIASTOLIC BLOOD PRESSURE: 46 MMHG | SYSTOLIC BLOOD PRESSURE: 169 MMHG

## 2021-09-07 VITALS — DIASTOLIC BLOOD PRESSURE: 55 MMHG | SYSTOLIC BLOOD PRESSURE: 164 MMHG

## 2021-09-07 VITALS — DIASTOLIC BLOOD PRESSURE: 57 MMHG | SYSTOLIC BLOOD PRESSURE: 171 MMHG

## 2021-09-07 VITALS — SYSTOLIC BLOOD PRESSURE: 158 MMHG | DIASTOLIC BLOOD PRESSURE: 52 MMHG

## 2021-09-07 VITALS — SYSTOLIC BLOOD PRESSURE: 155 MMHG | DIASTOLIC BLOOD PRESSURE: 56 MMHG

## 2021-09-07 VITALS — SYSTOLIC BLOOD PRESSURE: 180 MMHG | DIASTOLIC BLOOD PRESSURE: 57 MMHG

## 2021-09-07 VITALS — SYSTOLIC BLOOD PRESSURE: 166 MMHG | DIASTOLIC BLOOD PRESSURE: 56 MMHG

## 2021-09-07 VITALS — DIASTOLIC BLOOD PRESSURE: 52 MMHG | SYSTOLIC BLOOD PRESSURE: 152 MMHG

## 2021-09-07 VITALS — SYSTOLIC BLOOD PRESSURE: 139 MMHG | DIASTOLIC BLOOD PRESSURE: 49 MMHG

## 2021-09-07 VITALS — DIASTOLIC BLOOD PRESSURE: 46 MMHG | SYSTOLIC BLOOD PRESSURE: 151 MMHG

## 2021-09-07 VITALS — SYSTOLIC BLOOD PRESSURE: 154 MMHG | DIASTOLIC BLOOD PRESSURE: 62 MMHG

## 2021-09-07 VITALS — SYSTOLIC BLOOD PRESSURE: 156 MMHG | DIASTOLIC BLOOD PRESSURE: 54 MMHG

## 2021-09-07 VITALS — DIASTOLIC BLOOD PRESSURE: 74 MMHG | SYSTOLIC BLOOD PRESSURE: 203 MMHG

## 2021-09-07 LAB
ABSOLUTE BASOPHILS: 0 THOU/UL (ref 0–0.2)
ABSOLUTE EOSINOPHILS: 0 THOU/UL (ref 0–0.7)
ABSOLUTE MONOCYTES: 0.5 THOU/UL (ref 0–1.2)
ALBUMIN SERPL-MCNC: 2.2 G/DL (ref 3.4–5)
ALP SERPL-CCNC: 206 U/L (ref 46–116)
ALT SERPL-CCNC: 79 U/L (ref 30–65)
ANION GAP SERPL CALC-SCNC: 5 MMOL/L (ref 7–16)
AST SERPL-CCNC: 42 U/L (ref 15–37)
BASOPHILS NFR BLD AUTO: 0 %
BILIRUB SERPL-MCNC: 0.5 MG/DL
BUN SERPL-MCNC: 23 MG/DL (ref 7–18)
CALCIUM SERPL-MCNC: 9.3 MG/DL (ref 8.5–10.1)
CHLORIDE SERPL-SCNC: 108 MMOL/L (ref 98–107)
CO2 SERPL-SCNC: 32 MMOL/L (ref 21–32)
CREAT SERPL-MCNC: 0.3 MG/DL (ref 0.6–1.3)
EOSINOPHIL NFR BLD: 0.1 %
GLUCOSE SERPL-MCNC: 143 MG/DL (ref 70–99)
GRANULOCYTES NFR BLD MANUAL: 95.4 %
HCT VFR BLD CALC: 27.3 % (ref 37–47)
HGB BLD-MCNC: 9.2 GM/DL (ref 12–15)
LYMPHOCYTES # BLD: 0.4 THOU/UL (ref 0.8–5.3)
LYMPHOCYTES NFR BLD AUTO: 2 %
MCH RBC QN AUTO: 32.5 PG (ref 26–34)
MCHC RBC AUTO-ENTMCNC: 33.6 G/DL (ref 28–37)
MCV RBC: 96.8 FL (ref 80–100)
MONOCYTES NFR BLD: 2.5 %
MPV: 8.5 FL. (ref 7.2–11.1)
NEUTROPHILS # BLD: 17.1 THOU/UL (ref 1.6–8.1)
NUCLEATED RBCS: 0 /100WBC
PLATELET COUNT*: 245 THOU/UL (ref 150–400)
POTASSIUM SERPL-SCNC: 3.7 MMOL/L (ref 3.5–5.1)
PREALB SERPL-MCNC: 20.2 MG/DL (ref 18–35.7)
PROT SERPL-MCNC: 5.5 G/DL (ref 6.4–8.2)
RBC # BLD AUTO: 2.82 MIL/UL (ref 4.2–5)
RDW-CV: 15.7 % (ref 10.5–14.5)
SODIUM SERPL-SCNC: 145 MMOL/L (ref 136–145)
WBC # BLD AUTO: 18 THOU/UL (ref 4–11)

## 2021-09-07 NOTE — TEE
[place] [address]
Phone:  [phone]                     TRANSESOPHAGEAL ECHOCARDIOGRAM
_______________________________________________________________________________
 
Name:         ORALIA GUDINO               Room:          [address] [place] 
[facility]#:    [id number]     Account #:     [id number]  
Admission:    07/30/21    Attend Phys:   James Leigh
Discharge:                Date of Birth: [birth year]  
Date of Service: 09/07/21 1425  Report #:      [id number]
        [id number]
_______________________________________________________________________________
THIS REPORT FOR:
 
cc:  Alex Brady Michael G DO Liston, Michael J. MD [facility]     
                                                                       ~
 
--------------- APPROVED REPORT --------------
 
 
Study performed:  09/07/2021 11:27:17
 
EXAM: Transesophageal Echocardiogram 
Patient Location: In-Patient  
Room #:  [facility]    
     Status:  routine
 
      BSA:         1.69
HR: 100 bpm BP:          168/57 mmHg 
Rhythm: NSR     
 
Other Information 
Study Quality: Good
 
Indications
FUNGEMIA
 
Echo Enhancing Agent
Indication: Rule out Shunt
Agent(s) / Amount(s) Used: Agitated Saline 10 cc
 
Procedure
After obtaining informed consent, patient underwent transesophageal 
echo in the Bedside. 
Type of Sedation : General Anesthesia
Sedation was administered by ICU, on vent already sedated. 
Sedation start time:  1132           Case end Time:  1142
Sedation was achieved intravenously with:  Propofol 
()
Transesophageal probe was inserted and advanced into esophagus 
without difficulty by Alex Pena MD, FACC.
Echo enhancement indication: R/O Septal defect.
Echo enhancement agent administered: Agitated Saline
The JOSE was performed without complications. 
Throughout the procedure, the blood pressure, pulse oximetry, cardiac 
rhythm, and rate were monitored.
 
 
[place] [address]
Phone:  [phone]                     TRANSESOPHAGEAL ECHOCARDIOGRAM
_______________________________________________________________________________
 
Name:         ORALIA GUDINO               Room:          [address] [place] 
..#:    [id number]     Account #:     [id number]  
Admission:    07/30/21    Attend Phys:   James Leigh
Discharge:                Date of Birth: [birth year]  
Date of Service: 09/07/21 1425  Report #:      [id number]
        [id number]
_______________________________________________________________________________
The patient tolerated the procedure without adverse effects. Recovery 
from conscious sedation was uneventful and vital signs were 
stable.
 
Left Ventricle
The left ventricle is normal size. There is normal LV segmental wall 
motion. There is normal left ventricular wall thickness. Left 
ventricular systolic function is normal. LVEF is 65%.
 
Right Ventricle
The right ventricle is normal size. The right ventricular systolic 
function is normal.
 
Atria
The left atrium size is normal. No thrombus is visualized in the left 
atrium or appendage. The interatrial septum is intact with no 
evidence for an atrial septal defect. Central line seen in right 
atrium with vegetation.
 
Aortic Valve
The aortic valve is normal in structure. Trace aortic regurgitation. 
There is no aortic valvular stenosis.
 
Mitral Valve
The mitral valve is normal in structure. There is no mitral valve 
regurgitation noted. No evidence of mitral valve stenosis.
 
Tricuspid Valve
Tricuspid valve is not well visualized. There is no tricuspid valve 
regurgitation noted.
 
Pulmonic Valve
Pulmonic valve is not well visualized. There is no pulmonic valvular 
regurgitation.
 
Great Vessels
The aortic root is normal in size.
 
Pericardium
There is no pericardial effusion.
 
<Conclusion>
The left ventricle is normal size.
There is normal left ventricular wall thickness.
Left ventricular systolic function is normal.
LVEF is 65%.
 
 
[place] [address]
Phone:  [phone]                     TRANSESOPHAGEAL ECHOCARDIOGRAM
_______________________________________________________________________________
 
Name:         ORALIA GUDINO               Room:          [address]#:    [id number]     Account #:     [id number]  
Admission:    07/30/21    Attend Phys:   James Leigh
Discharge:                Date of Birth: [birth year]  
Date of Service: 09/07/21 1425  Report #:      [id number]
        [id number]
_______________________________________________________________________________
There is normal LV segmental wall motion.
The interatrial septum is intact with no evidence for an atrial 
septal defect.
The left atrium size is normal.
No thrombus is visualized in the left atrium or appendage.
Central line seen in right atrium with vegetation.
 
 
 
 
 
 
 
 
 
 
 
 
 
 
 
 
 
 
 
 
 
 
 
 
 
 
 
 
 
 
 
 
 
 
 
 
 
 
  <ELECTRONICALLY SIGNED>
                                           By: Alex Pena MD, FACC   
  09/07/21 1425
D: 09/07/21 1425   _____________________________________
T: 09/07/21 1425   Alex Pena MD, FACC     /INF

## 2021-09-08 VITALS — DIASTOLIC BLOOD PRESSURE: 61 MMHG | SYSTOLIC BLOOD PRESSURE: 154 MMHG

## 2021-09-08 VITALS — SYSTOLIC BLOOD PRESSURE: 153 MMHG | DIASTOLIC BLOOD PRESSURE: 64 MMHG

## 2021-09-08 VITALS — DIASTOLIC BLOOD PRESSURE: 42 MMHG | SYSTOLIC BLOOD PRESSURE: 183 MMHG

## 2021-09-08 VITALS — DIASTOLIC BLOOD PRESSURE: 58 MMHG | SYSTOLIC BLOOD PRESSURE: 151 MMHG

## 2021-09-08 VITALS — DIASTOLIC BLOOD PRESSURE: 59 MMHG | SYSTOLIC BLOOD PRESSURE: 151 MMHG

## 2021-09-08 VITALS — DIASTOLIC BLOOD PRESSURE: 49 MMHG | SYSTOLIC BLOOD PRESSURE: 163 MMHG

## 2021-09-08 VITALS — SYSTOLIC BLOOD PRESSURE: 158 MMHG | DIASTOLIC BLOOD PRESSURE: 48 MMHG

## 2021-09-08 VITALS — SYSTOLIC BLOOD PRESSURE: 142 MMHG | DIASTOLIC BLOOD PRESSURE: 55 MMHG

## 2021-09-08 VITALS — DIASTOLIC BLOOD PRESSURE: 56 MMHG | SYSTOLIC BLOOD PRESSURE: 147 MMHG

## 2021-09-08 VITALS — DIASTOLIC BLOOD PRESSURE: 47 MMHG | SYSTOLIC BLOOD PRESSURE: 159 MMHG

## 2021-09-08 VITALS — DIASTOLIC BLOOD PRESSURE: 62 MMHG | SYSTOLIC BLOOD PRESSURE: 154 MMHG

## 2021-09-08 VITALS — DIASTOLIC BLOOD PRESSURE: 60 MMHG | SYSTOLIC BLOOD PRESSURE: 154 MMHG

## 2021-09-08 VITALS — DIASTOLIC BLOOD PRESSURE: 58 MMHG | SYSTOLIC BLOOD PRESSURE: 160 MMHG

## 2021-09-08 VITALS — DIASTOLIC BLOOD PRESSURE: 62 MMHG | SYSTOLIC BLOOD PRESSURE: 166 MMHG

## 2021-09-08 VITALS — SYSTOLIC BLOOD PRESSURE: 149 MMHG | DIASTOLIC BLOOD PRESSURE: 60 MMHG

## 2021-09-08 VITALS — SYSTOLIC BLOOD PRESSURE: 144 MMHG | DIASTOLIC BLOOD PRESSURE: 54 MMHG

## 2021-09-08 VITALS — SYSTOLIC BLOOD PRESSURE: 154 MMHG | DIASTOLIC BLOOD PRESSURE: 45 MMHG

## 2021-09-08 VITALS — DIASTOLIC BLOOD PRESSURE: 59 MMHG | SYSTOLIC BLOOD PRESSURE: 156 MMHG

## 2021-09-08 VITALS — SYSTOLIC BLOOD PRESSURE: 175 MMHG | DIASTOLIC BLOOD PRESSURE: 63 MMHG

## 2021-09-08 VITALS — DIASTOLIC BLOOD PRESSURE: 56 MMHG | SYSTOLIC BLOOD PRESSURE: 171 MMHG

## 2021-09-08 VITALS — DIASTOLIC BLOOD PRESSURE: 62 MMHG | SYSTOLIC BLOOD PRESSURE: 111 MMHG

## 2021-09-08 VITALS — SYSTOLIC BLOOD PRESSURE: 164 MMHG | DIASTOLIC BLOOD PRESSURE: 66 MMHG

## 2021-09-08 VITALS — DIASTOLIC BLOOD PRESSURE: 57 MMHG | SYSTOLIC BLOOD PRESSURE: 147 MMHG

## 2021-09-08 VITALS — SYSTOLIC BLOOD PRESSURE: 192 MMHG | DIASTOLIC BLOOD PRESSURE: 65 MMHG

## 2021-09-08 VITALS — DIASTOLIC BLOOD PRESSURE: 71 MMHG | SYSTOLIC BLOOD PRESSURE: 182 MMHG

## 2021-09-08 VITALS — SYSTOLIC BLOOD PRESSURE: 164 MMHG | DIASTOLIC BLOOD PRESSURE: 61 MMHG

## 2021-09-08 VITALS — DIASTOLIC BLOOD PRESSURE: 56 MMHG | SYSTOLIC BLOOD PRESSURE: 144 MMHG

## 2021-09-08 LAB
ABSOLUTE MONOCYTES: 0.6 THOU/UL (ref 0–1.2)
ALBUMIN SERPL-MCNC: 2.3 G/DL (ref 3.4–5)
ALP SERPL-CCNC: 201 U/L (ref 46–116)
ALT SERPL-CCNC: 83 U/L (ref 30–65)
ANION GAP SERPL CALC-SCNC: 6 MMOL/L (ref 7–16)
ANISOCYTOSIS BLD QL SMEAR: (no result)
AST SERPL-CCNC: 36 U/L (ref 15–37)
BE: 8.5 MMOL/L
BE: 9.9 MMOL/L
BILIRUB SERPL-MCNC: 0.5 MG/DL
BUN SERPL-MCNC: 22 MG/DL (ref 7–18)
CALCIUM SERPL-MCNC: 8.6 MG/DL (ref 8.5–10.1)
CHLORIDE SERPL-SCNC: 99 MMOL/L (ref 98–107)
CO2 SERPL-SCNC: 33 MMOL/L (ref 21–32)
CREAT SERPL-MCNC: 0.4 MG/DL (ref 0.6–1.3)
GLUCOSE SERPL-MCNC: 225 MG/DL (ref 70–99)
GRANULOCYTES NFR BLD MANUAL: 91 %
HCT VFR BLD CALC: 28.1 % (ref 37–47)
HGB BLD-MCNC: 9.6 GM/DL (ref 12–15)
LIPASE: 617 U/L (ref 73–393)
LYMPHOCYTES # BLD: 1 THOU/UL (ref 0.8–5.3)
LYMPHOCYTES NFR BLD AUTO: 5 %
MAGNESIUM SERPL-MCNC: 1.9 MG/DL (ref 1.8–2.4)
MCH RBC QN AUTO: 33 PG (ref 26–34)
MCHC RBC AUTO-ENTMCNC: 34.2 G/DL (ref 28–37)
MCV RBC: 96.5 FL (ref 80–100)
MONOCYTES NFR BLD: 3 %
MPV: 8.8 FL. (ref 7.2–11.1)
MYELOCYTES NFR BLD: 1 %
NEUTROPHILS # BLD: 17.5 THOU/UL (ref 1.6–8.1)
NUCLEATED RBCS: 0 /100WBC
PCO2 BLD: 53.9 MMHG (ref 35–45)
PCO2 BLD: 57.3 MMHG (ref 35–45)
PHOSPHATE SERPL-MCNC: 2.5 MG/DL (ref 2.5–4.9)
PLATELET # BLD EST: ADEQUATE 10*3/UL
PLATELET COUNT*: 240 THOU/UL (ref 150–400)
PO2 BLD: 70.6 MMHG (ref 75–100)
PO2 BLD: 76.2 MMHG (ref 75–100)
POIKILOCYTOSIS BLD QL SMEAR: (no result)
POTASSIUM SERPL-SCNC: 3.5 MMOL/L (ref 3.5–5.1)
PROT SERPL-MCNC: 5.7 G/DL (ref 6.4–8.2)
RBC # BLD AUTO: 2.91 MIL/UL (ref 4.2–5)
RDW-CV: 15.5 % (ref 10.5–14.5)
SODIUM SERPL-SCNC: 138 MMOL/L (ref 136–145)
TRIGL SERPL-MCNC: 168 MG/DL (ref ?–150)
WBC # BLD AUTO: 19 THOU/UL (ref 4–11)

## 2021-09-09 VITALS — SYSTOLIC BLOOD PRESSURE: 149 MMHG | DIASTOLIC BLOOD PRESSURE: 59 MMHG

## 2021-09-09 VITALS — DIASTOLIC BLOOD PRESSURE: 53 MMHG | SYSTOLIC BLOOD PRESSURE: 123 MMHG

## 2021-09-09 VITALS — SYSTOLIC BLOOD PRESSURE: 142 MMHG | DIASTOLIC BLOOD PRESSURE: 56 MMHG

## 2021-09-09 VITALS — SYSTOLIC BLOOD PRESSURE: 150 MMHG | DIASTOLIC BLOOD PRESSURE: 62 MMHG

## 2021-09-09 VITALS — SYSTOLIC BLOOD PRESSURE: 151 MMHG | DIASTOLIC BLOOD PRESSURE: 58 MMHG

## 2021-09-09 VITALS — SYSTOLIC BLOOD PRESSURE: 146 MMHG | DIASTOLIC BLOOD PRESSURE: 50 MMHG

## 2021-09-09 VITALS — DIASTOLIC BLOOD PRESSURE: 52 MMHG | SYSTOLIC BLOOD PRESSURE: 108 MMHG

## 2021-09-09 VITALS — DIASTOLIC BLOOD PRESSURE: 52 MMHG | SYSTOLIC BLOOD PRESSURE: 139 MMHG

## 2021-09-09 VITALS — SYSTOLIC BLOOD PRESSURE: 141 MMHG | DIASTOLIC BLOOD PRESSURE: 54 MMHG

## 2021-09-09 VITALS — DIASTOLIC BLOOD PRESSURE: 49 MMHG | SYSTOLIC BLOOD PRESSURE: 115 MMHG

## 2021-09-09 VITALS — DIASTOLIC BLOOD PRESSURE: 54 MMHG | SYSTOLIC BLOOD PRESSURE: 152 MMHG

## 2021-09-09 VITALS — DIASTOLIC BLOOD PRESSURE: 56 MMHG | SYSTOLIC BLOOD PRESSURE: 140 MMHG

## 2021-09-09 VITALS — DIASTOLIC BLOOD PRESSURE: 45 MMHG | SYSTOLIC BLOOD PRESSURE: 131 MMHG

## 2021-09-09 VITALS — DIASTOLIC BLOOD PRESSURE: 54 MMHG | SYSTOLIC BLOOD PRESSURE: 138 MMHG

## 2021-09-09 VITALS — DIASTOLIC BLOOD PRESSURE: 56 MMHG | SYSTOLIC BLOOD PRESSURE: 148 MMHG

## 2021-09-09 VITALS — DIASTOLIC BLOOD PRESSURE: 52 MMHG | SYSTOLIC BLOOD PRESSURE: 136 MMHG

## 2021-09-09 VITALS — SYSTOLIC BLOOD PRESSURE: 131 MMHG | DIASTOLIC BLOOD PRESSURE: 53 MMHG

## 2021-09-09 VITALS — DIASTOLIC BLOOD PRESSURE: 57 MMHG | SYSTOLIC BLOOD PRESSURE: 156 MMHG

## 2021-09-09 VITALS — SYSTOLIC BLOOD PRESSURE: 120 MMHG | DIASTOLIC BLOOD PRESSURE: 49 MMHG

## 2021-09-09 VITALS — DIASTOLIC BLOOD PRESSURE: 51 MMHG | SYSTOLIC BLOOD PRESSURE: 134 MMHG

## 2021-09-09 VITALS — DIASTOLIC BLOOD PRESSURE: 45 MMHG | SYSTOLIC BLOOD PRESSURE: 113 MMHG

## 2021-09-09 VITALS — DIASTOLIC BLOOD PRESSURE: 59 MMHG | SYSTOLIC BLOOD PRESSURE: 156 MMHG

## 2021-09-09 VITALS — DIASTOLIC BLOOD PRESSURE: 45 MMHG | SYSTOLIC BLOOD PRESSURE: 103 MMHG

## 2021-09-09 VITALS — SYSTOLIC BLOOD PRESSURE: 165 MMHG | DIASTOLIC BLOOD PRESSURE: 63 MMHG

## 2021-09-09 VITALS — SYSTOLIC BLOOD PRESSURE: 147 MMHG | DIASTOLIC BLOOD PRESSURE: 56 MMHG

## 2021-09-09 VITALS — SYSTOLIC BLOOD PRESSURE: 136 MMHG | DIASTOLIC BLOOD PRESSURE: 52 MMHG

## 2021-09-09 VITALS — DIASTOLIC BLOOD PRESSURE: 47 MMHG | SYSTOLIC BLOOD PRESSURE: 135 MMHG

## 2021-09-09 VITALS — SYSTOLIC BLOOD PRESSURE: 148 MMHG | DIASTOLIC BLOOD PRESSURE: 60 MMHG

## 2021-09-09 VITALS — SYSTOLIC BLOOD PRESSURE: 147 MMHG | DIASTOLIC BLOOD PRESSURE: 58 MMHG

## 2021-09-09 LAB
ABSOLUTE BASOPHILS: 0 THOU/UL (ref 0–0.2)
ABSOLUTE EOSINOPHILS: 0 THOU/UL (ref 0–0.7)
ABSOLUTE MONOCYTES: 0.6 THOU/UL (ref 0–1.2)
ALBUMIN SERPL-MCNC: 2.2 G/DL (ref 3.4–5)
ALP SERPL-CCNC: 202 U/L (ref 46–116)
ALT SERPL-CCNC: 95 U/L (ref 30–65)
ANION GAP SERPL CALC-SCNC: 2 MMOL/L (ref 7–16)
ANION GAP SERPL CALC-SCNC: 3 MMOL/L (ref 7–16)
AST SERPL-CCNC: 51 U/L (ref 15–37)
BASOPHILS NFR BLD AUTO: 0 %
BE: 10 MMOL/L
BE: 11.6 MMOL/L
BILIRUB SERPL-MCNC: 0.7 MG/DL
BUN SERPL-MCNC: 25 MG/DL (ref 7–18)
BUN SERPL-MCNC: 25 MG/DL (ref 7–18)
CALCIUM SERPL-MCNC: 8.1 MG/DL (ref 8.5–10.1)
CALCIUM SERPL-MCNC: 8.2 MG/DL (ref 8.5–10.1)
CHLORIDE SERPL-SCNC: 100 MMOL/L (ref 98–107)
CHLORIDE SERPL-SCNC: 101 MMOL/L (ref 98–107)
CO2 SERPL-SCNC: 35 MMOL/L (ref 21–32)
CO2 SERPL-SCNC: 36 MMOL/L (ref 21–32)
CREAT SERPL-MCNC: 0.3 MG/DL (ref 0.6–1.3)
CREAT SERPL-MCNC: 0.4 MG/DL (ref 0.6–1.3)
EOSINOPHIL NFR BLD: 0.1 %
GLUCOSE SERPL-MCNC: 139 MG/DL (ref 70–99)
GLUCOSE SERPL-MCNC: 203 MG/DL (ref 70–99)
GRANULOCYTES NFR BLD MANUAL: 94.2 %
HCT VFR BLD CALC: 27.4 % (ref 37–47)
HGB BLD-MCNC: 9 GM/DL (ref 12–15)
LIPASE: 885 U/L (ref 73–393)
LYMPHOCYTES # BLD: 0.4 THOU/UL (ref 0.8–5.3)
LYMPHOCYTES NFR BLD AUTO: 2.2 %
MAGNESIUM SERPL-MCNC: 1.8 MG/DL (ref 1.8–2.4)
MAGNESIUM SERPL-MCNC: 2 MG/DL (ref 1.8–2.4)
MCH RBC QN AUTO: 31.8 PG (ref 26–34)
MCHC RBC AUTO-ENTMCNC: 32.8 G/DL (ref 28–37)
MCV RBC: 96.9 FL (ref 80–100)
MONOCYTES NFR BLD: 3.5 %
MPV: 8.4 FL. (ref 7.2–11.1)
NEUTROPHILS # BLD: 16.7 THOU/UL (ref 1.6–8.1)
NUCLEATED RBCS: 0 /100WBC
PCO2 BLD: 63.3 MMHG (ref 35–45)
PCO2 BLD: 70.3 MMHG (ref 35–45)
PHOSPHATE SERPL-MCNC: 2.7 MG/DL (ref 2.5–4.9)
PLATELET COUNT*: 229 THOU/UL (ref 150–400)
PO2 BLD: 72.3 MMHG (ref 75–100)
PO2 BLD: 74.8 MMHG (ref 75–100)
POTASSIUM SERPL-SCNC: 3.6 MMOL/L (ref 3.5–5.1)
POTASSIUM SERPL-SCNC: 3.9 MMOL/L (ref 3.5–5.1)
PROT SERPL-MCNC: 4.9 G/DL (ref 6.4–8.2)
RBC # BLD AUTO: 2.83 MIL/UL (ref 4.2–5)
RDW-CV: 15.8 % (ref 10.5–14.5)
SODIUM SERPL-SCNC: 138 MMOL/L (ref 136–145)
SODIUM SERPL-SCNC: 139 MMOL/L (ref 136–145)
TRIGL SERPL-MCNC: 211 MG/DL (ref ?–150)
WBC # BLD AUTO: 17.7 THOU/UL (ref 4–11)

## 2021-09-10 VITALS — DIASTOLIC BLOOD PRESSURE: 51 MMHG | SYSTOLIC BLOOD PRESSURE: 143 MMHG

## 2021-09-10 VITALS — SYSTOLIC BLOOD PRESSURE: 178 MMHG | DIASTOLIC BLOOD PRESSURE: 54 MMHG

## 2021-09-10 VITALS — SYSTOLIC BLOOD PRESSURE: 174 MMHG | DIASTOLIC BLOOD PRESSURE: 54 MMHG

## 2021-09-10 VITALS — DIASTOLIC BLOOD PRESSURE: 53 MMHG | SYSTOLIC BLOOD PRESSURE: 155 MMHG

## 2021-09-10 VITALS — DIASTOLIC BLOOD PRESSURE: 51 MMHG | SYSTOLIC BLOOD PRESSURE: 148 MMHG

## 2021-09-10 VITALS — SYSTOLIC BLOOD PRESSURE: 149 MMHG | DIASTOLIC BLOOD PRESSURE: 53 MMHG

## 2021-09-10 VITALS — DIASTOLIC BLOOD PRESSURE: 63 MMHG | SYSTOLIC BLOOD PRESSURE: 191 MMHG

## 2021-09-10 VITALS — DIASTOLIC BLOOD PRESSURE: 60 MMHG | SYSTOLIC BLOOD PRESSURE: 173 MMHG

## 2021-09-10 VITALS — SYSTOLIC BLOOD PRESSURE: 162 MMHG | DIASTOLIC BLOOD PRESSURE: 55 MMHG

## 2021-09-10 VITALS — SYSTOLIC BLOOD PRESSURE: 173 MMHG | DIASTOLIC BLOOD PRESSURE: 60 MMHG

## 2021-09-10 VITALS — DIASTOLIC BLOOD PRESSURE: 48 MMHG | SYSTOLIC BLOOD PRESSURE: 128 MMHG

## 2021-09-10 VITALS — SYSTOLIC BLOOD PRESSURE: 148 MMHG | DIASTOLIC BLOOD PRESSURE: 50 MMHG

## 2021-09-10 VITALS — DIASTOLIC BLOOD PRESSURE: 53 MMHG | SYSTOLIC BLOOD PRESSURE: 158 MMHG

## 2021-09-10 VITALS — DIASTOLIC BLOOD PRESSURE: 56 MMHG | SYSTOLIC BLOOD PRESSURE: 155 MMHG

## 2021-09-10 VITALS — DIASTOLIC BLOOD PRESSURE: 57 MMHG | SYSTOLIC BLOOD PRESSURE: 164 MMHG

## 2021-09-10 VITALS — SYSTOLIC BLOOD PRESSURE: 146 MMHG | DIASTOLIC BLOOD PRESSURE: 52 MMHG

## 2021-09-10 VITALS — DIASTOLIC BLOOD PRESSURE: 50 MMHG | SYSTOLIC BLOOD PRESSURE: 152 MMHG

## 2021-09-10 VITALS — DIASTOLIC BLOOD PRESSURE: 57 MMHG | SYSTOLIC BLOOD PRESSURE: 182 MMHG

## 2021-09-10 VITALS — SYSTOLIC BLOOD PRESSURE: 199 MMHG | DIASTOLIC BLOOD PRESSURE: 66 MMHG

## 2021-09-10 VITALS — DIASTOLIC BLOOD PRESSURE: 47 MMHG | SYSTOLIC BLOOD PRESSURE: 147 MMHG

## 2021-09-10 VITALS — DIASTOLIC BLOOD PRESSURE: 44 MMHG | SYSTOLIC BLOOD PRESSURE: 141 MMHG

## 2021-09-10 VITALS — SYSTOLIC BLOOD PRESSURE: 182 MMHG | DIASTOLIC BLOOD PRESSURE: 61 MMHG

## 2021-09-10 VITALS — SYSTOLIC BLOOD PRESSURE: 151 MMHG | DIASTOLIC BLOOD PRESSURE: 52 MMHG

## 2021-09-10 VITALS — SYSTOLIC BLOOD PRESSURE: 161 MMHG | DIASTOLIC BLOOD PRESSURE: 57 MMHG

## 2021-09-10 VITALS — DIASTOLIC BLOOD PRESSURE: 41 MMHG | SYSTOLIC BLOOD PRESSURE: 132 MMHG

## 2021-09-10 VITALS — SYSTOLIC BLOOD PRESSURE: 174 MMHG | DIASTOLIC BLOOD PRESSURE: 59 MMHG

## 2021-09-10 VITALS — SYSTOLIC BLOOD PRESSURE: 131 MMHG | DIASTOLIC BLOOD PRESSURE: 50 MMHG

## 2021-09-10 VITALS — DIASTOLIC BLOOD PRESSURE: 60 MMHG | SYSTOLIC BLOOD PRESSURE: 179 MMHG

## 2021-09-10 VITALS — DIASTOLIC BLOOD PRESSURE: 49 MMHG | SYSTOLIC BLOOD PRESSURE: 132 MMHG

## 2021-09-10 VITALS — SYSTOLIC BLOOD PRESSURE: 162 MMHG | DIASTOLIC BLOOD PRESSURE: 52 MMHG

## 2021-09-10 VITALS — DIASTOLIC BLOOD PRESSURE: 46 MMHG | SYSTOLIC BLOOD PRESSURE: 134 MMHG

## 2021-09-10 VITALS — DIASTOLIC BLOOD PRESSURE: 55 MMHG | SYSTOLIC BLOOD PRESSURE: 160 MMHG

## 2021-09-10 VITALS — SYSTOLIC BLOOD PRESSURE: 169 MMHG | DIASTOLIC BLOOD PRESSURE: 57 MMHG

## 2021-09-10 VITALS — SYSTOLIC BLOOD PRESSURE: 133 MMHG | DIASTOLIC BLOOD PRESSURE: 47 MMHG

## 2021-09-10 VITALS — SYSTOLIC BLOOD PRESSURE: 158 MMHG | DIASTOLIC BLOOD PRESSURE: 55 MMHG

## 2021-09-10 VITALS — SYSTOLIC BLOOD PRESSURE: 178 MMHG | DIASTOLIC BLOOD PRESSURE: 46 MMHG

## 2021-09-10 VITALS — SYSTOLIC BLOOD PRESSURE: 156 MMHG | DIASTOLIC BLOOD PRESSURE: 53 MMHG

## 2021-09-10 VITALS — SYSTOLIC BLOOD PRESSURE: 144 MMHG | DIASTOLIC BLOOD PRESSURE: 50 MMHG

## 2021-09-10 VITALS — SYSTOLIC BLOOD PRESSURE: 166 MMHG | DIASTOLIC BLOOD PRESSURE: 58 MMHG

## 2021-09-10 VITALS — DIASTOLIC BLOOD PRESSURE: 53 MMHG | SYSTOLIC BLOOD PRESSURE: 157 MMHG

## 2021-09-10 LAB
ABSOLUTE BASOPHILS: 0 THOU/UL (ref 0–0.2)
ABSOLUTE EOSINOPHILS: 0.1 THOU/UL (ref 0–0.7)
ABSOLUTE MONOCYTES: 0.5 THOU/UL (ref 0–1.2)
ALBUMIN SERPL-MCNC: 2.8 G/DL (ref 3.4–5)
ALP SERPL-CCNC: 223 U/L (ref 46–116)
ALT SERPL-CCNC: 156 U/L (ref 30–65)
ANION GAP SERPL CALC-SCNC: 3 MMOL/L (ref 7–16)
AST SERPL-CCNC: 62 U/L (ref 15–37)
BASOPHILS NFR BLD AUTO: 0.1 %
BE: 11.3 MMOL/L
BILIRUB SERPL-MCNC: 0.6 MG/DL
BUN SERPL-MCNC: 28 MG/DL (ref 7–18)
CALCIUM SERPL-MCNC: 9.2 MG/DL (ref 8.5–10.1)
CHLORIDE SERPL-SCNC: 105 MMOL/L (ref 98–107)
CO2 SERPL-SCNC: 37 MMOL/L (ref 21–32)
CREAT SERPL-MCNC: 0.4 MG/DL (ref 0.6–1.3)
EOSINOPHIL NFR BLD: 0.4 %
GLUCOSE SERPL-MCNC: 169 MG/DL (ref 70–99)
GRANULOCYTES NFR BLD MANUAL: 93.4 %
HCT VFR BLD CALC: 26.7 % (ref 37–47)
HGB BLD-MCNC: 8.9 GM/DL (ref 12–15)
LIPASE: 724 U/L (ref 73–393)
LYMPHOCYTES # BLD: 0.4 THOU/UL (ref 0.8–5.3)
LYMPHOCYTES NFR BLD AUTO: 2.4 %
MAGNESIUM SERPL-MCNC: 2.4 MG/DL (ref 1.8–2.4)
MCH RBC QN AUTO: 32.8 PG (ref 26–34)
MCHC RBC AUTO-ENTMCNC: 33.5 G/DL (ref 28–37)
MCV RBC: 98 FL (ref 80–100)
MONOCYTES NFR BLD: 3.7 %
MPV: 8.9 FL. (ref 7.2–11.1)
NEUTROPHILS # BLD: 13.9 THOU/UL (ref 1.6–8.1)
NUCLEATED RBCS: 0 /100WBC
PCO2 BLD: 65.7 MMHG (ref 35–45)
PHOSPHATE SERPL-MCNC: 3.5 MG/DL (ref 2.5–4.9)
PLATELET COUNT*: 228 THOU/UL (ref 150–400)
PO2 BLD: 72.2 MMHG (ref 75–100)
POTASSIUM SERPL-SCNC: 3.4 MMOL/L (ref 3.5–5.1)
PROT SERPL-MCNC: 6 G/DL (ref 6.4–8.2)
RBC # BLD AUTO: 2.72 MIL/UL (ref 4.2–5)
RDW-CV: 15.9 % (ref 10.5–14.5)
SODIUM SERPL-SCNC: 145 MMOL/L (ref 136–145)
TRIGL SERPL-MCNC: 191 MG/DL (ref ?–150)
WBC # BLD AUTO: 14.9 THOU/UL (ref 4–11)

## 2021-09-11 VITALS — DIASTOLIC BLOOD PRESSURE: 55 MMHG | SYSTOLIC BLOOD PRESSURE: 159 MMHG

## 2021-09-11 VITALS — SYSTOLIC BLOOD PRESSURE: 149 MMHG | DIASTOLIC BLOOD PRESSURE: 54 MMHG

## 2021-09-11 VITALS — SYSTOLIC BLOOD PRESSURE: 170 MMHG | DIASTOLIC BLOOD PRESSURE: 54 MMHG

## 2021-09-11 VITALS — DIASTOLIC BLOOD PRESSURE: 52 MMHG | SYSTOLIC BLOOD PRESSURE: 179 MMHG

## 2021-09-11 VITALS — DIASTOLIC BLOOD PRESSURE: 59 MMHG | SYSTOLIC BLOOD PRESSURE: 181 MMHG

## 2021-09-11 VITALS — SYSTOLIC BLOOD PRESSURE: 118 MMHG | DIASTOLIC BLOOD PRESSURE: 36 MMHG

## 2021-09-11 VITALS — DIASTOLIC BLOOD PRESSURE: 59 MMHG | SYSTOLIC BLOOD PRESSURE: 174 MMHG

## 2021-09-11 VITALS — SYSTOLIC BLOOD PRESSURE: 171 MMHG | DIASTOLIC BLOOD PRESSURE: 56 MMHG

## 2021-09-11 VITALS — DIASTOLIC BLOOD PRESSURE: 55 MMHG | SYSTOLIC BLOOD PRESSURE: 163 MMHG

## 2021-09-11 VITALS — DIASTOLIC BLOOD PRESSURE: 51 MMHG | SYSTOLIC BLOOD PRESSURE: 151 MMHG

## 2021-09-11 VITALS — SYSTOLIC BLOOD PRESSURE: 152 MMHG | DIASTOLIC BLOOD PRESSURE: 46 MMHG

## 2021-09-11 VITALS — SYSTOLIC BLOOD PRESSURE: 134 MMHG | DIASTOLIC BLOOD PRESSURE: 41 MMHG

## 2021-09-11 VITALS — DIASTOLIC BLOOD PRESSURE: 53 MMHG | SYSTOLIC BLOOD PRESSURE: 173 MMHG

## 2021-09-11 VITALS — DIASTOLIC BLOOD PRESSURE: 58 MMHG | SYSTOLIC BLOOD PRESSURE: 186 MMHG

## 2021-09-11 VITALS — DIASTOLIC BLOOD PRESSURE: 52 MMHG | SYSTOLIC BLOOD PRESSURE: 171 MMHG

## 2021-09-11 VITALS — DIASTOLIC BLOOD PRESSURE: 55 MMHG | SYSTOLIC BLOOD PRESSURE: 146 MMHG

## 2021-09-11 VITALS — SYSTOLIC BLOOD PRESSURE: 160 MMHG | DIASTOLIC BLOOD PRESSURE: 59 MMHG

## 2021-09-11 VITALS — DIASTOLIC BLOOD PRESSURE: 49 MMHG | SYSTOLIC BLOOD PRESSURE: 161 MMHG

## 2021-09-11 VITALS — DIASTOLIC BLOOD PRESSURE: 55 MMHG | SYSTOLIC BLOOD PRESSURE: 176 MMHG

## 2021-09-11 VITALS — DIASTOLIC BLOOD PRESSURE: 59 MMHG | SYSTOLIC BLOOD PRESSURE: 185 MMHG

## 2021-09-11 VITALS — DIASTOLIC BLOOD PRESSURE: 56 MMHG | SYSTOLIC BLOOD PRESSURE: 158 MMHG

## 2021-09-11 VITALS — SYSTOLIC BLOOD PRESSURE: 183 MMHG | DIASTOLIC BLOOD PRESSURE: 61 MMHG

## 2021-09-11 VITALS — DIASTOLIC BLOOD PRESSURE: 62 MMHG | SYSTOLIC BLOOD PRESSURE: 178 MMHG

## 2021-09-11 VITALS — SYSTOLIC BLOOD PRESSURE: 182 MMHG | DIASTOLIC BLOOD PRESSURE: 57 MMHG

## 2021-09-11 VITALS — DIASTOLIC BLOOD PRESSURE: 36 MMHG | SYSTOLIC BLOOD PRESSURE: 90 MMHG

## 2021-09-11 VITALS — DIASTOLIC BLOOD PRESSURE: 50 MMHG | SYSTOLIC BLOOD PRESSURE: 172 MMHG

## 2021-09-11 VITALS — DIASTOLIC BLOOD PRESSURE: 49 MMHG | SYSTOLIC BLOOD PRESSURE: 153 MMHG

## 2021-09-11 VITALS — SYSTOLIC BLOOD PRESSURE: 186 MMHG | DIASTOLIC BLOOD PRESSURE: 60 MMHG

## 2021-09-11 VITALS — SYSTOLIC BLOOD PRESSURE: 120 MMHG | DIASTOLIC BLOOD PRESSURE: 48 MMHG

## 2021-09-11 VITALS — DIASTOLIC BLOOD PRESSURE: 49 MMHG | SYSTOLIC BLOOD PRESSURE: 172 MMHG

## 2021-09-11 VITALS — DIASTOLIC BLOOD PRESSURE: 57 MMHG | SYSTOLIC BLOOD PRESSURE: 175 MMHG

## 2021-09-11 VITALS — DIASTOLIC BLOOD PRESSURE: 39 MMHG | SYSTOLIC BLOOD PRESSURE: 99 MMHG

## 2021-09-11 VITALS — DIASTOLIC BLOOD PRESSURE: 42 MMHG | SYSTOLIC BLOOD PRESSURE: 141 MMHG

## 2021-09-11 VITALS — SYSTOLIC BLOOD PRESSURE: 119 MMHG | DIASTOLIC BLOOD PRESSURE: 50 MMHG

## 2021-09-11 VITALS — DIASTOLIC BLOOD PRESSURE: 49 MMHG | SYSTOLIC BLOOD PRESSURE: 165 MMHG

## 2021-09-11 VITALS — SYSTOLIC BLOOD PRESSURE: 172 MMHG | DIASTOLIC BLOOD PRESSURE: 55 MMHG

## 2021-09-11 VITALS — DIASTOLIC BLOOD PRESSURE: 55 MMHG | SYSTOLIC BLOOD PRESSURE: 165 MMHG

## 2021-09-11 VITALS — DIASTOLIC BLOOD PRESSURE: 56 MMHG | SYSTOLIC BLOOD PRESSURE: 179 MMHG

## 2021-09-11 VITALS — SYSTOLIC BLOOD PRESSURE: 149 MMHG | DIASTOLIC BLOOD PRESSURE: 49 MMHG

## 2021-09-11 VITALS — SYSTOLIC BLOOD PRESSURE: 169 MMHG | DIASTOLIC BLOOD PRESSURE: 59 MMHG

## 2021-09-11 VITALS — SYSTOLIC BLOOD PRESSURE: 157 MMHG | DIASTOLIC BLOOD PRESSURE: 45 MMHG

## 2021-09-11 VITALS — DIASTOLIC BLOOD PRESSURE: 55 MMHG | SYSTOLIC BLOOD PRESSURE: 182 MMHG

## 2021-09-11 VITALS — DIASTOLIC BLOOD PRESSURE: 52 MMHG | SYSTOLIC BLOOD PRESSURE: 173 MMHG

## 2021-09-11 VITALS — SYSTOLIC BLOOD PRESSURE: 167 MMHG | DIASTOLIC BLOOD PRESSURE: 45 MMHG

## 2021-09-11 VITALS — DIASTOLIC BLOOD PRESSURE: 48 MMHG | SYSTOLIC BLOOD PRESSURE: 163 MMHG

## 2021-09-11 VITALS — SYSTOLIC BLOOD PRESSURE: 163 MMHG | DIASTOLIC BLOOD PRESSURE: 61 MMHG

## 2021-09-11 VITALS — SYSTOLIC BLOOD PRESSURE: 142 MMHG | DIASTOLIC BLOOD PRESSURE: 50 MMHG

## 2021-09-11 LAB
ABSOLUTE BASOPHILS: 0.1 THOU/UL (ref 0–0.2)
ABSOLUTE EOSINOPHILS: 0.1 THOU/UL (ref 0–0.7)
ABSOLUTE MONOCYTES: 0.5 THOU/UL (ref 0–1.2)
ALBUMIN SERPL-MCNC: 2.5 G/DL (ref 3.4–5)
ALP SERPL-CCNC: 259 U/L (ref 46–116)
ALT SERPL-CCNC: 170 U/L (ref 30–65)
ANION GAP SERPL CALC-SCNC: 2 MMOL/L (ref 7–16)
ANION GAP SERPL CALC-SCNC: 2 MMOL/L (ref 7–16)
ANISOCYTOSIS BLD QL SMEAR: (no result)
AST SERPL-CCNC: 70 U/L (ref 15–37)
BASOPHILS NFR BLD AUTO: 1 %
BE: 11.8 MMOL/L
BE: 8.8 MMOL/L
BILIRUB SERPL-MCNC: 0.7 MG/DL
BUN SERPL-MCNC: 28 MG/DL (ref 7–18)
BUN SERPL-MCNC: 28 MG/DL (ref 7–18)
CALCIUM SERPL-MCNC: 9.3 MG/DL (ref 8.5–10.1)
CALCIUM SERPL-MCNC: 9.5 MG/DL (ref 8.5–10.1)
CHLORIDE SERPL-SCNC: 107 MMOL/L (ref 98–107)
CHLORIDE SERPL-SCNC: 107 MMOL/L (ref 98–107)
CO2 SERPL-SCNC: 35 MMOL/L (ref 21–32)
CO2 SERPL-SCNC: 36 MMOL/L (ref 21–32)
CREAT SERPL-MCNC: 0.4 MG/DL (ref 0.6–1.3)
CREAT SERPL-MCNC: 0.4 MG/DL (ref 0.6–1.3)
EOSINOPHIL NFR BLD: 1 %
GLUCOSE SERPL-MCNC: 172 MG/DL (ref 70–99)
GLUCOSE SERPL-MCNC: 177 MG/DL (ref 70–99)
GRANULOCYTES NFR BLD MANUAL: 64 %
HCT VFR BLD CALC: 25.6 % (ref 37–47)
HGB BLD-MCNC: 8.4 GM/DL (ref 12–15)
LIPASE: 819 U/L (ref 73–393)
LYMPHOCYTES # BLD: 0.3 THOU/UL (ref 0.8–5.3)
LYMPHOCYTES NFR BLD AUTO: 2 %
MAGNESIUM SERPL-MCNC: 2.2 MG/DL (ref 1.8–2.4)
MCH RBC QN AUTO: 32.5 PG (ref 26–34)
MCHC RBC AUTO-ENTMCNC: 32.9 G/DL (ref 28–37)
MCV RBC: 98.8 FL (ref 80–100)
MONOCYTES NFR BLD: 4 %
MPV: 8.8 FL. (ref 7.2–11.1)
NEUTROPHILS # BLD: 11.9 THOU/UL (ref 1.6–8.1)
NEUTS BAND NFR BLD: 28 %
NUCLEATED RBCS: 0 /100WBC
PCO2 BLD: 55.8 MMHG (ref 35–45)
PCO2 BLD: 59.1 MMHG (ref 35–45)
PHOSPHATE SERPL-MCNC: 3 MG/DL (ref 2.5–4.9)
PLATELET # BLD EST: ADEQUATE 10*3/UL
PLATELET COUNT*: 207 THOU/UL (ref 150–400)
PO2 BLD: 74.8 MMHG (ref 75–100)
PO2 BLD: 81.2 MMHG (ref 75–100)
POLYCHROMASIA BLD QL SMEAR: (no result)
POTASSIUM SERPL-SCNC: 3.8 MMOL/L (ref 3.5–5.1)
POTASSIUM SERPL-SCNC: 3.8 MMOL/L (ref 3.5–5.1)
PROT SERPL-MCNC: 5.7 G/DL (ref 6.4–8.2)
RBC # BLD AUTO: 2.59 MIL/UL (ref 4.2–5)
RDW-CV: 16.5 % (ref 10.5–14.5)
SODIUM SERPL-SCNC: 144 MMOL/L (ref 136–145)
SODIUM SERPL-SCNC: 145 MMOL/L (ref 136–145)
TRIGL SERPL-MCNC: 180 MG/DL (ref ?–150)
WBC # BLD AUTO: 12.9 THOU/UL (ref 4–11)

## 2021-09-12 VITALS — DIASTOLIC BLOOD PRESSURE: 57 MMHG | SYSTOLIC BLOOD PRESSURE: 152 MMHG

## 2021-09-12 VITALS — DIASTOLIC BLOOD PRESSURE: 51 MMHG | SYSTOLIC BLOOD PRESSURE: 146 MMHG

## 2021-09-12 VITALS — SYSTOLIC BLOOD PRESSURE: 170 MMHG | DIASTOLIC BLOOD PRESSURE: 57 MMHG

## 2021-09-12 VITALS — SYSTOLIC BLOOD PRESSURE: 161 MMHG | DIASTOLIC BLOOD PRESSURE: 53 MMHG

## 2021-09-12 VITALS — SYSTOLIC BLOOD PRESSURE: 135 MMHG | DIASTOLIC BLOOD PRESSURE: 40 MMHG

## 2021-09-12 VITALS — SYSTOLIC BLOOD PRESSURE: 133 MMHG | DIASTOLIC BLOOD PRESSURE: 48 MMHG

## 2021-09-12 VITALS — SYSTOLIC BLOOD PRESSURE: 102 MMHG | DIASTOLIC BLOOD PRESSURE: 46 MMHG

## 2021-09-12 VITALS — DIASTOLIC BLOOD PRESSURE: 51 MMHG | SYSTOLIC BLOOD PRESSURE: 175 MMHG

## 2021-09-12 VITALS — SYSTOLIC BLOOD PRESSURE: 132 MMHG | DIASTOLIC BLOOD PRESSURE: 49 MMHG

## 2021-09-12 VITALS — DIASTOLIC BLOOD PRESSURE: 52 MMHG | SYSTOLIC BLOOD PRESSURE: 161 MMHG

## 2021-09-12 VITALS — SYSTOLIC BLOOD PRESSURE: 155 MMHG | DIASTOLIC BLOOD PRESSURE: 49 MMHG

## 2021-09-12 VITALS — DIASTOLIC BLOOD PRESSURE: 42 MMHG | SYSTOLIC BLOOD PRESSURE: 129 MMHG

## 2021-09-12 VITALS — DIASTOLIC BLOOD PRESSURE: 47 MMHG | SYSTOLIC BLOOD PRESSURE: 138 MMHG

## 2021-09-12 VITALS — DIASTOLIC BLOOD PRESSURE: 48 MMHG | SYSTOLIC BLOOD PRESSURE: 150 MMHG

## 2021-09-12 VITALS — DIASTOLIC BLOOD PRESSURE: 54 MMHG | SYSTOLIC BLOOD PRESSURE: 181 MMHG

## 2021-09-12 VITALS — DIASTOLIC BLOOD PRESSURE: 55 MMHG | SYSTOLIC BLOOD PRESSURE: 142 MMHG

## 2021-09-12 VITALS — SYSTOLIC BLOOD PRESSURE: 160 MMHG | DIASTOLIC BLOOD PRESSURE: 55 MMHG

## 2021-09-12 VITALS — SYSTOLIC BLOOD PRESSURE: 159 MMHG | DIASTOLIC BLOOD PRESSURE: 51 MMHG

## 2021-09-12 VITALS — SYSTOLIC BLOOD PRESSURE: 154 MMHG | DIASTOLIC BLOOD PRESSURE: 56 MMHG

## 2021-09-12 VITALS — SYSTOLIC BLOOD PRESSURE: 136 MMHG | DIASTOLIC BLOOD PRESSURE: 48 MMHG

## 2021-09-12 VITALS — SYSTOLIC BLOOD PRESSURE: 131 MMHG | DIASTOLIC BLOOD PRESSURE: 54 MMHG

## 2021-09-12 VITALS — SYSTOLIC BLOOD PRESSURE: 155 MMHG | DIASTOLIC BLOOD PRESSURE: 55 MMHG

## 2021-09-12 VITALS — SYSTOLIC BLOOD PRESSURE: 156 MMHG | DIASTOLIC BLOOD PRESSURE: 53 MMHG

## 2021-09-12 VITALS — DIASTOLIC BLOOD PRESSURE: 55 MMHG | SYSTOLIC BLOOD PRESSURE: 141 MMHG

## 2021-09-12 VITALS — SYSTOLIC BLOOD PRESSURE: 133 MMHG | DIASTOLIC BLOOD PRESSURE: 57 MMHG

## 2021-09-12 VITALS — DIASTOLIC BLOOD PRESSURE: 49 MMHG | SYSTOLIC BLOOD PRESSURE: 148 MMHG

## 2021-09-12 VITALS — DIASTOLIC BLOOD PRESSURE: 57 MMHG | SYSTOLIC BLOOD PRESSURE: 156 MMHG

## 2021-09-12 VITALS — DIASTOLIC BLOOD PRESSURE: 46 MMHG | SYSTOLIC BLOOD PRESSURE: 135 MMHG

## 2021-09-12 VITALS — DIASTOLIC BLOOD PRESSURE: 52 MMHG | SYSTOLIC BLOOD PRESSURE: 144 MMHG

## 2021-09-12 VITALS — DIASTOLIC BLOOD PRESSURE: 54 MMHG | SYSTOLIC BLOOD PRESSURE: 158 MMHG

## 2021-09-12 VITALS — DIASTOLIC BLOOD PRESSURE: 55 MMHG | SYSTOLIC BLOOD PRESSURE: 158 MMHG

## 2021-09-12 VITALS — SYSTOLIC BLOOD PRESSURE: 156 MMHG | DIASTOLIC BLOOD PRESSURE: 50 MMHG

## 2021-09-12 LAB
ABSOLUTE BASOPHILS: 0 THOU/UL (ref 0–0.2)
ABSOLUTE EOSINOPHILS: 0 THOU/UL (ref 0–0.7)
ABSOLUTE MONOCYTES: 0.5 THOU/UL (ref 0–1.2)
ALBUMIN SERPL-MCNC: 2.3 G/DL (ref 3.4–5)
ALP SERPL-CCNC: 273 U/L (ref 46–116)
ALT SERPL-CCNC: 195 U/L (ref 30–65)
ANION GAP SERPL CALC-SCNC: 1 MMOL/L (ref 7–16)
AST SERPL-CCNC: 83 U/L (ref 15–37)
BASOPHILS NFR BLD AUTO: 0 %
BILIRUB SERPL-MCNC: 0.5 MG/DL
BUN SERPL-MCNC: 34 MG/DL (ref 7–18)
CALCIUM SERPL-MCNC: 9.2 MG/DL (ref 8.5–10.1)
CHLORIDE SERPL-SCNC: 107 MMOL/L (ref 98–107)
CO2 SERPL-SCNC: 37 MMOL/L (ref 21–32)
CREAT SERPL-MCNC: 0.5 MG/DL (ref 0.6–1.3)
EOSINOPHIL NFR BLD: 0.1 %
GLUCOSE SERPL-MCNC: 195 MG/DL (ref 70–99)
GRANULOCYTES NFR BLD MANUAL: 92.9 %
HCT VFR BLD CALC: 24.8 % (ref 37–47)
HGB BLD-MCNC: 8.2 GM/DL (ref 12–15)
LYMPHOCYTES # BLD: 0.4 THOU/UL (ref 0.8–5.3)
LYMPHOCYTES NFR BLD AUTO: 3.2 %
MCH RBC QN AUTO: 32.8 PG (ref 26–34)
MCHC RBC AUTO-ENTMCNC: 33.2 G/DL (ref 28–37)
MCV RBC: 98.8 FL (ref 80–100)
MONOCYTES NFR BLD: 3.8 %
MPV: 8.6 FL. (ref 7.2–11.1)
NEUTROPHILS # BLD: 11.6 THOU/UL (ref 1.6–8.1)
NUCLEATED RBCS: 0 /100WBC
PLATELET COUNT*: 197 THOU/UL (ref 150–400)
POTASSIUM SERPL-SCNC: 3.6 MMOL/L (ref 3.5–5.1)
PROT SERPL-MCNC: 5.7 G/DL (ref 6.4–8.2)
RBC # BLD AUTO: 2.51 MIL/UL (ref 4.2–5)
RDW-CV: 16.6 % (ref 10.5–14.5)
SODIUM SERPL-SCNC: 145 MMOL/L (ref 136–145)
WBC # BLD AUTO: 12.5 THOU/UL (ref 4–11)

## 2021-09-13 VITALS — SYSTOLIC BLOOD PRESSURE: 164 MMHG | DIASTOLIC BLOOD PRESSURE: 59 MMHG

## 2021-09-13 VITALS — SYSTOLIC BLOOD PRESSURE: 176 MMHG | DIASTOLIC BLOOD PRESSURE: 52 MMHG

## 2021-09-13 VITALS — SYSTOLIC BLOOD PRESSURE: 153 MMHG | DIASTOLIC BLOOD PRESSURE: 53 MMHG

## 2021-09-13 VITALS — DIASTOLIC BLOOD PRESSURE: 59 MMHG | SYSTOLIC BLOOD PRESSURE: 186 MMHG

## 2021-09-13 VITALS — DIASTOLIC BLOOD PRESSURE: 53 MMHG | SYSTOLIC BLOOD PRESSURE: 162 MMHG

## 2021-09-13 VITALS — SYSTOLIC BLOOD PRESSURE: 94 MMHG | DIASTOLIC BLOOD PRESSURE: 40 MMHG

## 2021-09-13 VITALS — SYSTOLIC BLOOD PRESSURE: 143 MMHG | DIASTOLIC BLOOD PRESSURE: 48 MMHG

## 2021-09-13 VITALS — DIASTOLIC BLOOD PRESSURE: 44 MMHG | SYSTOLIC BLOOD PRESSURE: 104 MMHG

## 2021-09-13 VITALS — DIASTOLIC BLOOD PRESSURE: 41 MMHG | SYSTOLIC BLOOD PRESSURE: 152 MMHG

## 2021-09-13 VITALS — DIASTOLIC BLOOD PRESSURE: 54 MMHG | SYSTOLIC BLOOD PRESSURE: 181 MMHG

## 2021-09-13 VITALS — DIASTOLIC BLOOD PRESSURE: 76 MMHG | SYSTOLIC BLOOD PRESSURE: 161 MMHG

## 2021-09-13 VITALS — DIASTOLIC BLOOD PRESSURE: 67 MMHG | SYSTOLIC BLOOD PRESSURE: 169 MMHG

## 2021-09-13 VITALS — DIASTOLIC BLOOD PRESSURE: 62 MMHG | SYSTOLIC BLOOD PRESSURE: 174 MMHG

## 2021-09-13 VITALS — DIASTOLIC BLOOD PRESSURE: 53 MMHG | SYSTOLIC BLOOD PRESSURE: 172 MMHG

## 2021-09-13 VITALS — DIASTOLIC BLOOD PRESSURE: 58 MMHG | SYSTOLIC BLOOD PRESSURE: 139 MMHG

## 2021-09-13 VITALS — SYSTOLIC BLOOD PRESSURE: 178 MMHG | DIASTOLIC BLOOD PRESSURE: 51 MMHG

## 2021-09-13 VITALS — DIASTOLIC BLOOD PRESSURE: 48 MMHG | SYSTOLIC BLOOD PRESSURE: 182 MMHG

## 2021-09-13 VITALS — DIASTOLIC BLOOD PRESSURE: 57 MMHG | SYSTOLIC BLOOD PRESSURE: 190 MMHG

## 2021-09-13 VITALS — DIASTOLIC BLOOD PRESSURE: 53 MMHG | SYSTOLIC BLOOD PRESSURE: 170 MMHG

## 2021-09-13 VITALS — DIASTOLIC BLOOD PRESSURE: 64 MMHG | SYSTOLIC BLOOD PRESSURE: 202 MMHG

## 2021-09-13 VITALS — SYSTOLIC BLOOD PRESSURE: 192 MMHG | DIASTOLIC BLOOD PRESSURE: 66 MMHG

## 2021-09-13 VITALS — DIASTOLIC BLOOD PRESSURE: 59 MMHG | SYSTOLIC BLOOD PRESSURE: 171 MMHG

## 2021-09-13 VITALS — DIASTOLIC BLOOD PRESSURE: 48 MMHG | SYSTOLIC BLOOD PRESSURE: 174 MMHG

## 2021-09-13 VITALS — SYSTOLIC BLOOD PRESSURE: 164 MMHG | DIASTOLIC BLOOD PRESSURE: 53 MMHG

## 2021-09-13 LAB
ABSOLUTE BASOPHILS: 0 THOU/UL (ref 0–0.2)
ABSOLUTE EOSINOPHILS: 0 THOU/UL (ref 0–0.7)
ABSOLUTE MONOCYTES: 0.7 THOU/UL (ref 0–1.2)
ALBUMIN SERPL-MCNC: 2.6 G/DL (ref 3.4–5)
ALP SERPL-CCNC: 366 U/L (ref 46–116)
ALT SERPL-CCNC: 287 U/L (ref 30–65)
ANION GAP SERPL CALC-SCNC: 5 MMOL/L (ref 7–16)
ANION GAP SERPL CALC-SCNC: 7 MMOL/L (ref 7–16)
AST SERPL-CCNC: 123 U/L (ref 15–37)
BASOPHILS NFR BLD AUTO: 0.3 %
BE: 11.2 MMOL/L
BILIRUB SERPL-MCNC: 0.5 MG/DL
BUN SERPL-MCNC: 38 MG/DL (ref 7–18)
BUN SERPL-MCNC: 39 MG/DL (ref 7–18)
CALCIUM SERPL-MCNC: 10 MG/DL (ref 8.5–10.1)
CALCIUM SERPL-MCNC: 10.2 MG/DL (ref 8.5–10.1)
CHLORIDE SERPL-SCNC: 108 MMOL/L (ref 98–107)
CHLORIDE SERPL-SCNC: 108 MMOL/L (ref 98–107)
CO2 SERPL-SCNC: 34 MMOL/L (ref 21–32)
CO2 SERPL-SCNC: 35 MMOL/L (ref 21–32)
CREAT SERPL-MCNC: 0.4 MG/DL (ref 0.6–1.3)
CREAT SERPL-MCNC: 0.4 MG/DL (ref 0.6–1.3)
EOSINOPHIL NFR BLD: 0.2 %
GLUCOSE SERPL-MCNC: 174 MG/DL (ref 70–99)
GLUCOSE SERPL-MCNC: 179 MG/DL (ref 70–99)
GRANULOCYTES NFR BLD MANUAL: 91.2 %
HCT VFR BLD CALC: 27.4 % (ref 37–47)
HGB BLD-MCNC: 8.6 GM/DL (ref 12–15)
LIPASE: 1813 U/L (ref 73–393)
LYMPHOCYTES # BLD: 0.5 THOU/UL (ref 0.8–5.3)
LYMPHOCYTES NFR BLD AUTO: 3.5 %
MAGNESIUM SERPL-MCNC: 2.3 MG/DL (ref 1.8–2.4)
MCH RBC QN AUTO: 31.5 PG (ref 26–34)
MCHC RBC AUTO-ENTMCNC: 31.5 G/DL (ref 28–37)
MCV RBC: 99.8 FL (ref 80–100)
MONOCYTES NFR BLD: 4.8 %
MPV: 8.6 FL. (ref 7.2–11.1)
NEUTROPHILS # BLD: 13.8 THOU/UL (ref 1.6–8.1)
NUCLEATED RBCS: 1 /100WBC
PCO2 BLD: 66.2 MMHG (ref 35–45)
PHOSPHATE SERPL-MCNC: 2.6 MG/DL (ref 2.5–4.9)
PLATELET COUNT*: 217 THOU/UL (ref 150–400)
PO2 BLD: 74.8 MMHG (ref 75–100)
POTASSIUM SERPL-SCNC: 3.8 MMOL/L (ref 3.5–5.1)
POTASSIUM SERPL-SCNC: 3.8 MMOL/L (ref 3.5–5.1)
PROT SERPL-MCNC: 6.1 G/DL (ref 6.4–8.2)
RBC # BLD AUTO: 2.75 MIL/UL (ref 4.2–5)
RDW-CV: 17.2 % (ref 10.5–14.5)
SODIUM SERPL-SCNC: 148 MMOL/L (ref 136–145)
SODIUM SERPL-SCNC: 149 MMOL/L (ref 136–145)
TRIGL SERPL-MCNC: 213 MG/DL (ref ?–150)
WBC # BLD AUTO: 15.1 THOU/UL (ref 4–11)

## 2021-09-14 VITALS — DIASTOLIC BLOOD PRESSURE: 38 MMHG | SYSTOLIC BLOOD PRESSURE: 142 MMHG

## 2021-09-14 VITALS — SYSTOLIC BLOOD PRESSURE: 186 MMHG | DIASTOLIC BLOOD PRESSURE: 52 MMHG

## 2021-09-14 VITALS — SYSTOLIC BLOOD PRESSURE: 174 MMHG | DIASTOLIC BLOOD PRESSURE: 52 MMHG

## 2021-09-14 VITALS — DIASTOLIC BLOOD PRESSURE: 43 MMHG | SYSTOLIC BLOOD PRESSURE: 137 MMHG

## 2021-09-14 VITALS — SYSTOLIC BLOOD PRESSURE: 169 MMHG | DIASTOLIC BLOOD PRESSURE: 52 MMHG

## 2021-09-14 VITALS — DIASTOLIC BLOOD PRESSURE: 46 MMHG | SYSTOLIC BLOOD PRESSURE: 151 MMHG

## 2021-09-14 VITALS — DIASTOLIC BLOOD PRESSURE: 46 MMHG | SYSTOLIC BLOOD PRESSURE: 164 MMHG

## 2021-09-14 VITALS — DIASTOLIC BLOOD PRESSURE: 51 MMHG | SYSTOLIC BLOOD PRESSURE: 166 MMHG

## 2021-09-14 VITALS — DIASTOLIC BLOOD PRESSURE: 39 MMHG | SYSTOLIC BLOOD PRESSURE: 133 MMHG

## 2021-09-14 VITALS — DIASTOLIC BLOOD PRESSURE: 51 MMHG | SYSTOLIC BLOOD PRESSURE: 156 MMHG

## 2021-09-14 VITALS — SYSTOLIC BLOOD PRESSURE: 105 MMHG | DIASTOLIC BLOOD PRESSURE: 37 MMHG

## 2021-09-14 VITALS — DIASTOLIC BLOOD PRESSURE: 47 MMHG | SYSTOLIC BLOOD PRESSURE: 164 MMHG

## 2021-09-14 VITALS — SYSTOLIC BLOOD PRESSURE: 162 MMHG | DIASTOLIC BLOOD PRESSURE: 47 MMHG

## 2021-09-14 VITALS — DIASTOLIC BLOOD PRESSURE: 53 MMHG | SYSTOLIC BLOOD PRESSURE: 185 MMHG

## 2021-09-14 VITALS — DIASTOLIC BLOOD PRESSURE: 51 MMHG | SYSTOLIC BLOOD PRESSURE: 180 MMHG

## 2021-09-14 VITALS — DIASTOLIC BLOOD PRESSURE: 52 MMHG | SYSTOLIC BLOOD PRESSURE: 176 MMHG

## 2021-09-14 VITALS — SYSTOLIC BLOOD PRESSURE: 175 MMHG | DIASTOLIC BLOOD PRESSURE: 47 MMHG

## 2021-09-14 VITALS — DIASTOLIC BLOOD PRESSURE: 51 MMHG | SYSTOLIC BLOOD PRESSURE: 183 MMHG

## 2021-09-14 LAB
ABSOLUTE MONOCYTES: 0.6 THOU/UL (ref 0–1.2)
ALBUMIN SERPL-MCNC: 2.2 G/DL (ref 3.4–5)
ALP SERPL-CCNC: 347 U/L (ref 46–116)
ALT SERPL-CCNC: 285 U/L (ref 30–65)
ANION GAP SERPL CALC-SCNC: < 0 MMOL/L (ref 7–16)
ANISOCYTOSIS BLD QL SMEAR: (no result)
AST SERPL-CCNC: 113 U/L (ref 15–37)
BE: 7.5 MMOL/L
BILIRUB SERPL-MCNC: 0.4 MG/DL
BUN SERPL-MCNC: 38 MG/DL (ref 7–18)
CALCIUM SERPL-MCNC: 9.7 MG/DL (ref 8.5–10.1)
CHLORIDE SERPL-SCNC: 117 MMOL/L (ref 98–107)
CO2 SERPL-SCNC: 36 MMOL/L (ref 21–32)
CREAT SERPL-MCNC: 0.5 MG/DL (ref 0.6–1.3)
GLUCOSE SERPL-MCNC: 304 MG/DL (ref 70–99)
GRANULOCYTES NFR BLD MANUAL: 90 %
HCT VFR BLD CALC: 24.4 % (ref 37–47)
HGB BLD-MCNC: 7.8 GM/DL (ref 12–15)
LYMPHOCYTES # BLD: 0.7 THOU/UL (ref 0.8–5.3)
LYMPHOCYTES NFR BLD AUTO: 5 %
MAGNESIUM SERPL-MCNC: 2.2 MG/DL (ref 1.8–2.4)
MCH RBC QN AUTO: 32.7 PG (ref 26–34)
MCHC RBC AUTO-ENTMCNC: 32.2 G/DL (ref 28–37)
MCV RBC: 101.7 FL (ref 80–100)
MONOCYTES NFR BLD: 4 %
MPV: 8.7 FL. (ref 7.2–11.1)
NEUTROPHILS # BLD: 13.6 THOU/UL (ref 1.6–8.1)
NEUTS BAND NFR BLD: 1 %
NUCLEATED RBCS: 0 /100WBC
PCO2 BLD: 66 MMHG (ref 35–45)
PHOSPHATE SERPL-MCNC: 2.8 MG/DL (ref 2.5–4.9)
PLATELET # BLD EST: ADEQUATE 10*3/UL
PLATELET COUNT*: 175 THOU/UL (ref 150–400)
PO2 BLD: 75.1 MMHG (ref 75–100)
POIKILOCYTOSIS BLD QL SMEAR: (no result)
POLYCHROMASIA BLD QL SMEAR: (no result)
POTASSIUM SERPL-SCNC: 3.8 MMOL/L (ref 3.5–5.1)
PROT SERPL-MCNC: 5.5 G/DL (ref 6.4–8.2)
RBC # BLD AUTO: 2.4 MIL/UL (ref 4.2–5)
RDW-CV: 17.3 % (ref 10.5–14.5)
SODIUM SERPL-SCNC: 152 MMOL/L (ref 136–145)
TRIGL SERPL-MCNC: 265 MG/DL (ref ?–150)
WBC # BLD AUTO: 14.9 THOU/UL (ref 4–11)

## 2021-09-15 VITALS — SYSTOLIC BLOOD PRESSURE: 173 MMHG | DIASTOLIC BLOOD PRESSURE: 43 MMHG

## 2021-09-15 VITALS — SYSTOLIC BLOOD PRESSURE: 168 MMHG | DIASTOLIC BLOOD PRESSURE: 41 MMHG

## 2021-09-15 VITALS — DIASTOLIC BLOOD PRESSURE: 43 MMHG | SYSTOLIC BLOOD PRESSURE: 153 MMHG

## 2021-09-15 VITALS — SYSTOLIC BLOOD PRESSURE: 179 MMHG | DIASTOLIC BLOOD PRESSURE: 45 MMHG

## 2021-09-15 VITALS — DIASTOLIC BLOOD PRESSURE: 38 MMHG | SYSTOLIC BLOOD PRESSURE: 165 MMHG

## 2021-09-15 VITALS — SYSTOLIC BLOOD PRESSURE: 148 MMHG | DIASTOLIC BLOOD PRESSURE: 42 MMHG

## 2021-09-15 VITALS — DIASTOLIC BLOOD PRESSURE: 51 MMHG | SYSTOLIC BLOOD PRESSURE: 191 MMHG

## 2021-09-15 VITALS — SYSTOLIC BLOOD PRESSURE: 171 MMHG | DIASTOLIC BLOOD PRESSURE: 43 MMHG

## 2021-09-15 VITALS — DIASTOLIC BLOOD PRESSURE: 59 MMHG | SYSTOLIC BLOOD PRESSURE: 150 MMHG

## 2021-09-15 VITALS — SYSTOLIC BLOOD PRESSURE: 173 MMHG | DIASTOLIC BLOOD PRESSURE: 44 MMHG

## 2021-09-15 VITALS — DIASTOLIC BLOOD PRESSURE: 42 MMHG | SYSTOLIC BLOOD PRESSURE: 156 MMHG

## 2021-09-15 VITALS — SYSTOLIC BLOOD PRESSURE: 166 MMHG | DIASTOLIC BLOOD PRESSURE: 47 MMHG

## 2021-09-15 VITALS — DIASTOLIC BLOOD PRESSURE: 47 MMHG | SYSTOLIC BLOOD PRESSURE: 178 MMHG

## 2021-09-15 VITALS — DIASTOLIC BLOOD PRESSURE: 40 MMHG | SYSTOLIC BLOOD PRESSURE: 156 MMHG

## 2021-09-15 VITALS — DIASTOLIC BLOOD PRESSURE: 60 MMHG | SYSTOLIC BLOOD PRESSURE: 152 MMHG

## 2021-09-15 VITALS — DIASTOLIC BLOOD PRESSURE: 38 MMHG | SYSTOLIC BLOOD PRESSURE: 164 MMHG

## 2021-09-22 NOTE — CON
[address]  [address]                    CONSULTATION                  
_______________________________________________________________________________
 
Name:       ORALIA GUDINO                Room:           [address] [place]  
M.R.#:  [id number]      Account #:      [id number]  
Admission:  07/30/21     Attend Phys:    JUANCARLOS Pizano
Discharge:  09/15/21     Date of Birth:  [birth year]  
         Report #: [id number]
                                                                     [id number]
_______________________________________________________________________________
THIS REPORT FOR:  
 
cc:  Alex Brady,Alex Sullivan,Lyle CRUZ MD                                              ~
 
 
DATE OF CONSULTATION: 08/31/2021
 
HISTORY OF PRESENT ILLNESS:  This 75-year-old female patient who was seen by me 
last night and this morning.  The patient was seen by me for asymmetrical pupil.
 It was noticed and they did a CT scan of the head.  I reviewed the CT scan of 
the head, which does not show any edema and no evidence of any herniation.
 
REVIEW OF SYSTEMS:  Positive for the fact that she was admitted with shortness 
of breath and initially she was negative for COVID and that is what I think from
the record, it looks like was treated with multiple complications.  A 14-point 
review of system was carried out from the record.  She has COVID.  At one time 
she had hypokalemia, hypertension, respiratory failure.  Pulmonary infiltrate, 
elevated D-dimer.  ARDS.
 
PAST MEDICAL HISTORY:  Positive for this COVID.
 
FAMILY HISTORY:  Unremarkable.
 
SOCIAL HISTORY:  She has a son there and I talked to him.
 
PHYSICAL EXAMINATION:
NEUROLOGIC:  Last night and this night was carried out, it was very limited 
because of the respiratory problems.  She has to be in a particular posture.  
Last night, her pupil was asymmetrical, but this morning, it is pretty 
symmetrical.  Rest of the neurological examination is not possible because of 
the sedation, but cranial nerve and neuromuscular examination was attempted.  
She is still having respiratory difficulty.  She is obese.
VITAL SIGNS:  Blood pressure is running about 119/56, temperature is 97.6, pulse
is 78.
 
IMPRESSION:  Since the CT scan is normal, it is unlikely that there is any 
neurological etiology for the patient's symptoms.  This is most likely 
ophthalmology etiology.  I discussed with the hospitalist and then discussed 
with son.  I do not think any ophthalmologist comes here on a regular basis and 
they need to monitor it and decide what can be done in the circumstances.  
Neurologically, I do not think it is etiology for the patient's pupillary 
 
 
 
[place] [address]                    CONSULTATION                  
_______________________________________________________________________________
 
Name:       ORALIA GUDINO                Room:           [address]#:  [id number]      Account #:      [id number]  
Admission:  07/30/21     Attend Phys:    JUANCARLOS Pizano
Discharge:  09/15/21     Date of Birth:  [birth year]  
         Report #: [id number]
                                                                     [id number]
_______________________________________________________________________________
 
 
dilation and I am unable to sign off.  Please call if the things changes.  I 
discussed that with his son and he is agreeable with this approach.
 
 
 
 
 
 
 
 
 
 
 
 
 
 
 
 
 
 
 
 
 
 
 
 
 
 
 
 
 
 
 
 
 
 
 
 
 
 
 
 
<ELECTRONICALLY SIGNED>
                                        By:  Lyle Dodson MD         
09/22/21     0841
D: 08/31/21 1439_______________________________________
T: 08/31/21 MD pippa Rojo